# Patient Record
Sex: FEMALE | Race: ASIAN | NOT HISPANIC OR LATINO | Employment: UNEMPLOYED | URBAN - METROPOLITAN AREA
[De-identification: names, ages, dates, MRNs, and addresses within clinical notes are randomized per-mention and may not be internally consistent; named-entity substitution may affect disease eponyms.]

---

## 2017-03-13 ENCOUNTER — OFFICE VISIT (OUTPATIENT)
Dept: FAMILY MEDICINE | Facility: CLINIC | Age: 57
End: 2017-03-13
Payer: COMMERCIAL

## 2017-03-13 VITALS
HEIGHT: 60 IN | TEMPERATURE: 98 F | SYSTOLIC BLOOD PRESSURE: 139 MMHG | BODY MASS INDEX: 30.23 KG/M2 | HEART RATE: 65 BPM | OXYGEN SATURATION: 100 % | DIASTOLIC BLOOD PRESSURE: 80 MMHG | WEIGHT: 154 LBS

## 2017-03-13 DIAGNOSIS — I10 ESSENTIAL HYPERTENSION: ICD-10-CM

## 2017-03-13 DIAGNOSIS — H10.33 ACUTE CONJUNCTIVITIS OF BOTH EYES, UNSPECIFIED ACUTE CONJUNCTIVITIS TYPE: Primary | ICD-10-CM

## 2017-03-13 PROCEDURE — 99213 OFFICE O/P EST LOW 20 MIN: CPT | Performed by: INTERNAL MEDICINE

## 2017-03-13 RX ORDER — ERYTHROMYCIN 5 MG/G
1 OINTMENT OPHTHALMIC AT BEDTIME
Qty: 1 TUBE | Refills: 0 | Status: SHIPPED | OUTPATIENT
Start: 2017-03-13 | End: 2017-03-13

## 2017-03-13 RX ORDER — ERYTHROMYCIN 5 MG/G
1 OINTMENT OPHTHALMIC 4 TIMES DAILY
Qty: 1 TUBE | Refills: 0 | Status: SHIPPED | OUTPATIENT
Start: 2017-03-13 | End: 2017-07-14

## 2017-03-13 NOTE — NURSING NOTE
Chief Complaint   Patient presents with     Eye Problem     red. itchy, mattery, worse since Friday.  Juanito has pink eye.  Tried Eye Allergy Relief drops  & cough syrup        Initial /76 (BP Location: Right arm, Patient Position: Chair, Cuff Size: Adult Regular)  Pulse 65  Temp 98  F (36.7  C) (Oral)  Ht 5' (1.524 m)  Wt 154 lb (69.9 kg)  SpO2 100%  Breastfeeding? No  BMI 30.08 kg/m2 Estimated body mass index is 30.08 kg/(m^2) as calculated from the following:    Height as of this encounter: 5' (1.524 m).    Weight as of this encounter: 154 lb (69.9 kg).  Medication Reconciliation: complete

## 2017-03-13 NOTE — LETTER
[facility]  [address] [place] [address] [place] [address]  Phone: [phone]    03/13/17    Violet Nunez  [address] [place] [id number]      To whom it may concern:     Patient should be excused from work for medical reason on following dates:  3/13/17 through 3/14/17.        Sincerely,      Loli Thao MD

## 2017-03-13 NOTE — MR AVS SNAPSHOT
"              After Visit Summary   3/13/2017    Violet Nunez    MRN: [id number]           Patient Information     Date Of Birth          1960        Visit Information        Provider Department      3/13/2017 1:00 PM Loli Thao MD [facility]        Today's Diagnoses     Acute conjunctivitis of both eyes, unspecified acute conjunctivitis type    -  1    Essential hypertension          Care Instructions    Monitor your blood pressure once a week  at home.  Bring those readings on your next visit.  Notify us if your blood pressure readings consistently stays greater than 140/90.  Use erythromycin opthalmic ointment  Follow up in 2 months  Seek sooner medical attention if there is any worsening of symptoms or problems.          Follow-ups after your visit        Who to contact     If you have questions or need follow up information about today's clinic visit or your schedule please contact [facility] directly at [phone].  Normal or non-critical lab and imaging results will be communicated to you by MyChart, letter or phone within 4 business days after the clinic has received the results. If you do not hear from us within 7 days, please contact the clinic through MyChart or phone. If you have a critical or abnormal lab result, we will notify you by phone as soon as possible.  Submit refill requests through [employer] or call your pharmacy and they will forward the refill request to us. Please allow 3 business days for your refill to be completed.          Additional Information About Your Visit        MyChart Information     [employer] lets you send messages to your doctor, view your test results, renew your prescriptions, schedule appointments and more. To sign up, go to [email or web address] . Click on \"Log in\" on the left side of the screen, which will take you to the Welcome page. Then click on \"Sign up Now\" on the right side of the page.     You will be asked to enter " the access code listed below, as well as some personal information. Please follow the directions to create your username and password.     Your access code is: [email or web address]  Expires: 2017  1:37 PM     Your access code will  in 90 days. If you need help or a new code, please call your [facility] clinic or [phone].        Care EveryWhere ID     This is your Care EveryWhere ID. This could be used by other organizations to access your [facility] medical records  [id number]        Your Vitals Were     Pulse Temperature Height Pulse Oximetry Breastfeeding? BMI (Body Mass Index)    65 98  F (36.7  C) (Oral) 5' (1.524 m) 100% No 30.08 kg/m2       Blood Pressure from Last 3 Encounters:   17 139/80   16 149/77   16 139/78    Weight from Last 3 Encounters:   17 154 lb (69.9 kg)   16 157 lb (71.2 kg)   16 154 lb (69.9 kg)              Today, you had the following     No orders found for display         Today's Medication Changes          These changes are accurate as of: 3/13/17  1:37 PM.  If you have any questions, ask your nurse or doctor.               Start taking these medicines.        Dose/Directions    erythromycin ophthalmic ointment   Commonly known as:  ROMYCIN   Used for:  Acute conjunctivitis of both eyes, unspecified acute conjunctivitis type   Started by:  Loli Thao MD        Dose:  1 Application   Place 1 Application into both eyes 4 times daily Please note the change   Quantity:  1 Tube   Refills:  0            Where to get your medicines      These medications were sent to [facility], [place] - [address]  [address], [address], [facility] [address]     Phone:  [phone]     erythromycin ophthalmic ointment                Primary Care Provider Office Phone # Fax #    Loli Thao [phone]       [facility]    [address]  [facility] [address]         Thank you!     Thank you for choosing [facility]  for your care. Our goal is always to provide you with excellent care. Hearing back from our patients is one way we can continue to improve our services. Please take a few minutes to complete the written survey that you may receive in the mail after your visit with us. Thank you!             Your Updated Medication List - Protect others around you: Learn how to safely use, store and throw away your medicines at www.disposemymeds.org.          This list is accurate as of: 3/13/17  1:37 PM.  Always use your most recent med list.                   Brand Name Dispense Instructions for use    atenolol 50 MG tablet    TENORMIN    90 tablet    Take 1 tablet (50 mg) by mouth daily       erythromycin ophthalmic ointment    ROMYCIN    1 Tube    Place 1 Application into both eyes 4 times daily Please note the change       vitamin D 2000 UNITS Caps      Take 1 tablet by mouth daily.

## 2017-03-13 NOTE — PROGRESS NOTES
SUBJECTIVE:                                                    Violet Nunez is a 56 year old female who presents to clinic today for the following health issues:  She is accompanied by her daughter  Chief Complaint   Patient presents with     Eye Problem     red. itchy, mattery, worse since Friday.  Juanito has pink eye.  Tried Eye Allergy Relief drops  & cough syrup      Patient has had red and itchy eyes since Friday  When she wakes up in the morning her eyelids are crusted together  Has had a headache since yesterday  Her vision is not effected and she denies any burning sensation  She tried using eye drops for allergies without any improvement  Has had exposure to pink eye from her coworkers    Has not checked home blood pressure readings recently   Blood pressure was always high when they were measuring her blood pressure      Problem list and histories reviewed & adjusted, as indicated.  Additional history: as documented    Patient Active Problem List   Diagnosis     Vitamin D deficiency     Tendonitis of wrist, left     Essential hypertension     CARDIOVASCULAR SCREENING; LDL GOAL LESS THAN 130     Obesity (BMI 30-39.9)     Snoring     Cervical high risk HPV (human papillomavirus) test positive     Mixed hyperlipidemia     Past Surgical History   Procedure Laterality Date     Tubal/ectopic pregnancy       Colonoscopy  9/9/2013     Procedure: COLONOSCOPY;  COLONOSCOPY ;  Surgeon: Marcelino Crook MD;  Location:  GI       Social History   Substance Use Topics     Smoking status: Never Smoker     Smokeless tobacco: Never Used     Alcohol use No     Family History   Problem Relation Age of Onset     Hypertension Mother      DIABETES Mother      Hypertension       Hypertension Sister      Breast Cancer No family hx of      Cancer - colorectal No family hx of      Family History Negative Father          Current Outpatient Prescriptions   Medication Sig Dispense Refill     atenolol (TENORMIN) 50 MG  tablet Take 1 tablet (50 mg) by mouth daily 90 tablet 3     Cholecalciferol (VITAMIN D) 2000 UNIT CAPS Take 1 tablet by mouth daily.       Allergies   Allergen Reactions     No Known Allergies        Reviewed and updated as needed this visit by clinical staff       Reviewed and updated as needed this visit by Provider         ROS:  Constitutional, HEENT, cardiovascular, pulmonary, gi and gu systems are negative, except as otherwise noted.    POS for red and itchy eyes bilaterally, headache       This document serves as a record of the services and decisions personally performed and made by Loli Thao MD. It was created on her behalf by Mitch Romo, a trained medical scribe. The creation of this document is based on the provider's statements to the medical scribe.    Scribmaxx Romo 1:28 PM, March 13, 2017    OBJECTIVE:                                                    /76 (BP Location: Right arm, Patient Position: Chair, Cuff Size: Adult Regular)  Pulse 65  Temp 98  F (36.7  C) (Oral)  Ht 1.524 m (5')  Wt 69.9 kg (154 lb)  SpO2 100%  Breastfeeding? No  BMI 30.08 kg/m2  Body mass index is 30.08 kg/(m^2).  GENERAL APPEARANCE: healthy, alert and no distress  EYES: Erythema bilaterally,  corneas clear, no photophobia,   Vision normal  PSYCH: mentation appears normal and affect normal/bright       ASSESSMENT/PLAN:                                                      Violet was seen today for eye problem.    Diagnoses and all orders for this visit:    Acute conjunctivitis of both eyes, unspecified acute conjunctivitis type  Patient has had exposure to pink eye from her coworkers  She will use erythromycin opthalmic ointment to treat her condition  Was given a note to be excused from work to avoid further transmission  Was advised to be careful to avoid transmission to family members at home  -     Discontinue: erythromycin (ROMYCIN) ophthalmic ointment; Place 1 Application into both eyes At  Bedtime  -     erythromycin (ROMYCIN) ophthalmic ointment; Place 1 Application into both eyes 4 times daily Please note the change    Essential hypertension  Patient's blood pressure has been elevated  If blood pressure remains high I will start her on lisinopril 5 mg and keep her atenolol the same  Discussed the importance of keeping BP under good control to reduce the risk of heart attack and stroke.   Advised to continue to monitor bp once a week  at home and bring those readings on next visit.  Notify us if bp readings consistently stay greater than 140/90 mmHg  Discussed the importance of physical activity and a healthy diet      Follow up in 2 months  Patient was advised to seek sooner medical attention if there is any new or worsening symptoms    The information in this document, created by the medical scribe for me, accurately reflects the services I personally performed and the decisions made by me. I have reviewed and approved this document for accuracy prior to leaving the patient care area.  Loli Thao MD  1:40 PM, 03/13/17    Loli Thao MD  [facility]

## 2017-03-13 NOTE — PATIENT INSTRUCTIONS
Monitor your blood pressure once a week  at home.  Bring those readings on your next visit.  Notify us if your blood pressure readings consistently stays greater than 140/90.  Use erythromycin opthalmic ointment  Follow up in 2 months  Seek sooner medical attention if there is any worsening of symptoms or problems.

## 2017-04-06 ENCOUNTER — TELEPHONE (OUTPATIENT)
Dept: FAMILY MEDICINE | Facility: CLINIC | Age: 57
End: 2017-04-06

## 2017-07-06 ENCOUNTER — DOCUMENTATION ONLY (OUTPATIENT)
Dept: LAB | Facility: CLINIC | Age: 57
End: 2017-07-06

## 2017-07-06 DIAGNOSIS — E78.2 MIXED HYPERLIPIDEMIA: Primary | ICD-10-CM

## 2017-07-06 DIAGNOSIS — E55.9 VITAMIN D DEFICIENCY: ICD-10-CM

## 2017-07-06 NOTE — PROGRESS NOTES
This patient is coming in on 7/7/2017 for labs. As of now there are no orders in the chart. Please review patient chart and place orders. Thanks!  -Lab Staff

## 2017-07-07 DIAGNOSIS — E78.2 MIXED HYPERLIPIDEMIA: ICD-10-CM

## 2017-07-07 DIAGNOSIS — E55.9 VITAMIN D DEFICIENCY: ICD-10-CM

## 2017-07-07 LAB
CHOLEST SERPL-MCNC: 219 MG/DL
DEPRECATED CALCIDIOL+CALCIFEROL SERPL-MC: 47 UG/L (ref 20–75)
HDLC SERPL-MCNC: 79 MG/DL
LDLC SERPL CALC-MCNC: 125 MG/DL
NONHDLC SERPL-MCNC: 140 MG/DL
TRIGL SERPL-MCNC: 76 MG/DL

## 2017-07-07 PROCEDURE — 82306 VITAMIN D 25 HYDROXY: CPT | Performed by: INTERNAL MEDICINE

## 2017-07-07 PROCEDURE — 80061 LIPID PANEL: CPT | Performed by: INTERNAL MEDICINE

## 2017-07-07 PROCEDURE — 36415 COLL VENOUS BLD VENIPUNCTURE: CPT | Performed by: INTERNAL MEDICINE

## 2017-07-14 ENCOUNTER — OFFICE VISIT (OUTPATIENT)
Dept: FAMILY MEDICINE | Facility: CLINIC | Age: 57
End: 2017-07-14
Payer: COMMERCIAL

## 2017-07-14 ENCOUNTER — TELEPHONE (OUTPATIENT)
Dept: FAMILY MEDICINE | Facility: CLINIC | Age: 57
End: 2017-07-14

## 2017-07-14 VITALS
HEIGHT: 60 IN | HEART RATE: 72 BPM | DIASTOLIC BLOOD PRESSURE: 71 MMHG | TEMPERATURE: 99.2 F | BODY MASS INDEX: 30.04 KG/M2 | WEIGHT: 153 LBS | OXYGEN SATURATION: 98 % | SYSTOLIC BLOOD PRESSURE: 131 MMHG

## 2017-07-14 DIAGNOSIS — I10 ESSENTIAL HYPERTENSION: Primary | ICD-10-CM

## 2017-07-14 DIAGNOSIS — M25.562 ACUTE PAIN OF LEFT KNEE: ICD-10-CM

## 2017-07-14 PROCEDURE — 99213 OFFICE O/P EST LOW 20 MIN: CPT | Performed by: INTERNAL MEDICINE

## 2017-07-14 RX ORDER — NAPROXEN 500 MG/1
500 TABLET ORAL 2 TIMES DAILY WITH MEALS
Qty: 14 TABLET | Refills: 1 | Status: SHIPPED | OUTPATIENT
Start: 2017-07-14 | End: 2018-12-31

## 2017-07-14 NOTE — PATIENT INSTRUCTIONS
Take Naproxen twice a week with food for 1 week  If pain and swelling do not improve after 1 week of Naproxen see orthopedist.  Follow up in 6 months  Seek sooner medical attention if there is any worsening of symptoms or problems.

## 2017-07-14 NOTE — MR AVS SNAPSHOT
After Visit Summary   7/14/2017    Violet Nunez    MRN: [id number]           Patient Information     Date Of Birth          1960        Visit Information        Provider Department      7/14/2017 12:30 PM Loli Thao MD [facility]        Today's Diagnoses     Essential hypertension    -  1    Acute pain of left knee          Care Instructions    Take Naproxen twice a week with food for 1 week  If pain and swelling do not improve after 1 week of Naproxen see orthopedist.  Follow up in 6 months  Seek sooner medical attention if there is any worsening of symptoms or problems.            Follow-ups after your visit        Additional Services     ORTHOPEDICS ADULT REFERRAL       Your provider has referred you to: [facility] [place] [phone]   [email or web address]    Please be aware that coverage of these services is subject to the terms and limitations of your health insurance plan.  Call member services at your health plan with any benefit or coverage questions.      Please bring the following to your appointment:    >>   Any x-rays, CTs or MRIs which have been performed.  Contact the facility where they were done to arrange for  prior to your scheduled appointment.    >>   List of current medications   >>   This referral request   >>   Any documents/labs given to you for this referral                  Who to contact     If you have questions or need follow up information about today's clinic visit or your schedule please contact [facility] directly at [phone].  Normal or non-critical lab and imaging results will be communicated to you by MyChart, letter or phone within 4 business days after the clinic has received the results. If you do not hear from us within 7 days, please contact the clinic through MyChart or phone. If you have a critical or abnormal lab result, we will notify you by phone as soon as  "possible.  Submit refill requests through "[employer] or call your pharmacy and they will forward the refill request to us. Please allow 3 business days for your refill to be completed.          Additional Information About Your Visit        "[employer] Information     "[employer] lets you send messages to your doctor, view your test results, renew your prescriptions, schedule appointments and more. To sign up, go to [email or web address] . Click on \"Log in\" on the left side of the screen, which will take you to the Welcome page. Then click on \"Sign up Now\" on the right side of the page.     You will be asked to enter the access code listed below, as well as some personal information. Please follow the directions to create your username and password.     Your access code is: [email or web address]  Expires: 10/12/2017 12:48 PM     Your access code will  in 90 days. If you need help or a new code, please call your [place] clinic or [phone].        Care EveryWhere ID     This is your Care EveryWhere ID. This could be used by other organizations to access your [facility] medical records  [id number]        Your Vitals Were     Pulse Temperature Height Pulse Oximetry Breastfeeding? BMI (Body Mass Index)    72 99.2  F (37.3  C) (Oral) 5' (1.524 m) 98% No 29.88 kg/m2       Blood Pressure from Last 3 Encounters:   17 131/71   17 139/80   16 149/77    Weight from Last 3 Encounters:   17 153 lb (69.4 kg)   17 154 lb (69.9 kg)   16 157 lb (71.2 kg)              We Performed the Following     ORTHOPEDICS ADULT REFERRAL          Today's Medication Changes          These changes are accurate as of: 17 12:48 PM.  If you have any questions, ask your nurse or doctor.               Start taking these medicines.        Dose/Directions    naproxen 500 MG tablet   Commonly known as:  NAPROSYN   Used for:  Acute pain of left knee   Started by:  Loli Thao MD        Dose:  500 mg   Take 1 tablet " (500 mg) by mouth 2 times daily (with meals)   Quantity:  14 tablet   Refills:  1            Where to get your medicines      These medications were sent to [facility] [address]  [address] [address]     Phone:  [phone]     naproxen 500 MG tablet                Primary Care Provider Office Phone # Fax #    Loli Thao [phone]       [facility]    [address] [facility] [address]  [facility] [address]        Equal Access to Services     [facility] CenterTRINIDAD : Hadii aad ku hadasho Soevangelista, waaxda luqadaha, qaybta kaalmada ademagnoyada, waxrocael pann chandra ramesh . So [facility] [phone].    ATENCIÓN: Si habla español, tiene a moran disposición servicios gratuitos de asistencia lingüística. Klaudiaame al [phone].    We comply with applicable federal civil rights laws and [place] laws. We do not discriminate on the basis of race, color, national origin, age, disability sex, sexual orientation or gender identity.            Thank you!     Thank you for choosing [facility]  for your care. Our goal is always to provide you with excellent care. Hearing back from our patients is one way we can continue to improve our services. Please take a few minutes to complete the written survey that you may receive in the mail after your visit with us. Thank you!             Your Updated Medication List - Protect others around you: Learn how to safely use, store and throw away your medicines at www.disposemymeds.org.          This list is accurate as of: 7/14/17 12:48 PM.  Always use your most recent med list.                   Brand Name Dispense Instructions for use Diagnosis    atenolol 50 MG tablet    TENORMIN    90 tablet    Take 1 tablet (50 mg) by mouth daily    Essential hypertension       naproxen 500 MG tablet    NAPROSYN    14 tablet    Take 1 tablet (500 mg) by mouth 2 times daily  (with meals)    Acute pain of left knee       vitamin D 2000 UNITS Caps      Take 1 tablet by mouth daily.

## 2017-07-14 NOTE — PROGRESS NOTES
SUBJECTIVE:                                                    Violet Nunez is a 56 year old female who presents to clinic today for the following health issues:      Musculoskeletal problem/pain      Duration: x 1 month    Description  Location: Left knee pain-intermittent   Patient describes that it feels like the muscle is stiff     Intensity:  mild    Accompanying signs and symptoms: muscle pain/strain in lower left leg when bending or kneeling.    History  Previous similar problem: no   Previous evaluation:  none    Precipitating or alleviating factors:  Trauma or overuse: YES- exercising /walking.  Aggravating factors include: walking, exercise, overuse and kneeling and bending.    Therapies tried and outcome: rest/inactivity, stretching and acetaminophen- SX's aren't improving too much.    Denies discomfort when going up or down the stairs   Denies any trauma     Hypertension Follow-up      Outpatient blood pressures are being checked at home.  Results are <140/90 mmHg.    Low Salt Diet: not monitoring salt      Problem list and histories reviewed & adjusted, as indicated.  Additional history: as documented    Patient Active Problem List   Diagnosis     Vitamin D deficiency     Tendonitis of wrist, left     Essential hypertension     CARDIOVASCULAR SCREENING; LDL GOAL LESS THAN 130     Obesity (BMI 30-39.9)     Snoring     Cervical high risk HPV (human papillomavirus) test positive     Mixed hyperlipidemia     Past Surgical History:   Procedure Laterality Date     COLONOSCOPY  9/9/2013    Procedure: COLONOSCOPY;  COLONOSCOPY ;  Surgeon: Marcelino Crook MD;  Location:  GI     TUBAL/ECTOPIC PREGNANCY         Social History   Substance Use Topics     Smoking status: Never Smoker     Smokeless tobacco: Never Used     Alcohol use No     Family History   Problem Relation Age of Onset     Hypertension Mother      DIABETES Mother      Hypertension       Hypertension Sister      Breast Cancer No family hx  of      Cancer - colorectal No family hx of      Family History Negative Father          Current Outpatient Prescriptions   Medication Sig Dispense Refill     atenolol (TENORMIN) 50 MG tablet Take 1 tablet (50 mg) by mouth daily 90 tablet 3     Cholecalciferol (VITAMIN D) 2000 UNIT CAPS Take 1 tablet by mouth daily.       Allergies   Allergen Reactions     No Known Allergies        Reviewed and updated as needed this visit by clinical staff  Tobacco  Allergies  Soc Hx      Reviewed and updated as needed this visit by Provider         ROS:  Constitutional, HEENT, cardiovascular, pulmonary, gi and gu systems are negative, except as otherwise noted.    This document serves as a record of the services and decisions personally performed and made by Loli Thao MD. It was created on her behalf by Yulia Tidwell, a trained medical scribe. The creation of this document is based the provider's statements to the medical scribe.    Prince Tidwell 12:41 PM, July 14, 2017    OBJECTIVE:                                                    /71 (BP Location: Right arm, Patient Position: Sitting, Cuff Size: Adult Regular)  Pulse 72  Temp 99.2  F (37.3  C) (Oral)  Ht 5' (1.524 m)  Wt 153 lb (69.4 kg)  SpO2 98%  Breastfeeding? No  BMI 29.88 kg/m2  Body mass index is 29.88 kg/(m^2).  GENERAL APPEARANCE: alert, no distress and over weight  MS: left knee is evidently swollen compared to the right, normal ROM.   PSYCH: mentation appears normal and affect normal/bright       ASSESSMENT/PLAN:                                                    Violet was seen today for recheck.    Diagnoses and all orders for this visit:    Essential hypertension  Well controlled. Continue present medication.    Acute pain of left knee  -     naproxen (NAPROSYN) 500 MG tablet; Take 1 tablet (500 mg) by mouth 2 times daily (with meals)  -     ORTHOPEDICS ADULT REFERRAL  Unclear etiology. Could be an injury at work.  Will try  Naproxen for a week  If no improvement, advised to see orthopedist     Follow up in 6 months  Seek sooner medical attention if there is any worsening of symptoms or problems.      The information in this document, created by the medical scribe for me, accurately reflects the services I personally performed and the decisions made by me. I have reviewed and approved this document for accuracy prior to leaving the patient care area.  Loli Thao MD  12:46 PM, 07/14/17    Loli Thao MD  [facility]

## 2017-07-14 NOTE — NURSING NOTE
Chief Complaint   Patient presents with     RECHECK     knee       Initial /71 (BP Location: Right arm, Patient Position: Sitting, Cuff Size: Adult Regular)  Pulse 72  Temp 99.2  F (37.3  C) (Oral)  Ht 5' (1.524 m)  Wt 153 lb (69.4 kg)  SpO2 98%  Breastfeeding? No  BMI 29.88 kg/m2 Estimated body mass index is 29.88 kg/(m^2) as calculated from the following:    Height as of this encounter: 5' (1.524 m).    Weight as of this encounter: 153 lb (69.4 kg).  Medication Reconciliation: complete   Yolanda Huron- CMA

## 2017-08-01 DIAGNOSIS — I10 ESSENTIAL HYPERTENSION: ICD-10-CM

## 2017-08-01 RX ORDER — ATENOLOL 50 MG/1
TABLET ORAL
Start: 2017-08-01

## 2017-08-01 NOTE — TELEPHONE ENCOUNTER
atenolol (TENORMIN) 50 MG tablet        Last Written Prescription Date: 11/17/2016-Profile Only  Last Fill Quantity: 90, # refills: 3    Last Office Visit with [facility], [facility] or [facility] prescribing provider:  7/14/2017   Future Office Visit:        BP Readings from Last 3 Encounters:   07/14/17 131/71   03/13/17 139/80   12/05/16 149/77

## 2017-08-02 ENCOUNTER — TELEPHONE (OUTPATIENT)
Dept: FAMILY MEDICINE | Facility: CLINIC | Age: 57
End: 2017-08-02

## 2017-08-02 DIAGNOSIS — I10 ESSENTIAL HYPERTENSION: Primary | ICD-10-CM

## 2017-08-02 NOTE — TELEPHONE ENCOUNTER
Fax from Rosi Hooper requesting an alternative to Atenolol as it is on back order    Atenolol      Last Written Prescription Date: 11/17/2016  Last Fill Quantity: 90, # refills: 3    Last Office Visit with [facility], [facility] or [facility] prescribing provider:  7/14/2017 [place]       Potassium   Date Value Ref Range Status   11/30/2016 4.7 3.4 - 5.3 mmol/L Final     Creatinine   Date Value Ref Range Status   11/30/2016 0.65 0.52 - 1.04 mg/dL Final     BP Readings from Last 3 Encounters:   07/14/17 131/71   03/13/17 139/80   12/05/16 149/77     RT Kenzie(R)

## 2017-08-03 RX ORDER — METOPROLOL SUCCINATE 100 MG/1
100 TABLET, EXTENDED RELEASE ORAL DAILY
Qty: 90 TABLET | Refills: 1 | Status: SHIPPED | OUTPATIENT
Start: 2017-08-03 | End: 2018-01-28

## 2017-08-03 NOTE — TELEPHONE ENCOUNTER
Patient returning nurse call, if possible please leave as  Much info on vm as possible if she doesn't answer

## 2017-08-03 NOTE — TELEPHONE ENCOUNTER
Let patient know that we switched her medication due to unavailability of atenolol  Monitor BP at home weekly.  Follow up appointment in one month after switching medication   Dr.Nasima Master MD

## 2017-08-03 NOTE — TELEPHONE ENCOUNTER
Called Pt to discuss below. No answer. Left message advising them to call back into clinic. Awaiting callback.     Dayna Ann RN

## 2017-08-08 NOTE — TELEPHONE ENCOUNTER
Spoke with daughter on MANUEL.  She is wanting to know why pt switched off of Atenolol  Told her there is a shortage and med no longer available  Daughter states pt used to be on Atenolol which was $3 and now Metoprolol is $60  Daughter will callback if further questions or concerns, was informed this shortage would not be forever  Could eventually discuss getting back on Atenolol when higher supplies  Keri CHEW RN

## 2017-08-15 NOTE — TELEPHONE ENCOUNTER
Reason for Call: Medication     Detailed comments: Daughter Mc is calling to see if Violet Nunez needs to take the 100 mg of if she needs to take it in smaller dosage. You can leave a voice mail if no one is available     Phone Number Patient can be reached at: Home number on file [phone] (home)    Best Time: ASAP     Can we leave a detailed message on this number? YES    Call taken on 8/15/2017 at 11:19 AM by Kyra Davey

## 2017-08-15 NOTE — TELEPHONE ENCOUNTER
Huddle with Dr. Thao and this is the correct dose she wants her on and patient needs to be checking her BP after the change as well to see if there is any change in BP.    Call to Mc and recommendations explained to her and she understands and agrees with the plan.  She will let us know regarding BP results.  Kamryn Peralta RN

## 2017-10-18 ENCOUNTER — TRANSFERRED RECORDS (OUTPATIENT)
Dept: HEALTH INFORMATION MANAGEMENT | Facility: CLINIC | Age: 57
End: 2017-10-18

## 2017-11-21 ENCOUNTER — TELEPHONE (OUTPATIENT)
Dept: FAMILY MEDICINE | Facility: CLINIC | Age: 57
End: 2017-11-21

## 2017-11-21 DIAGNOSIS — Z79.899 MEDICATION MANAGEMENT: ICD-10-CM

## 2017-11-21 DIAGNOSIS — E78.2 MIXED HYPERLIPIDEMIA: Primary | ICD-10-CM

## 2017-11-21 NOTE — TELEPHONE ENCOUNTER
Reason for Call: Request for an order or referral:    Order or referral being requested: Fasting Labs    Date needed: as soon as possible    Has the patient been seen by the PCP for this problem? NO    Additional comments: Pt wants Fasting Lab Order put in so they can go to the [facility] to have her Labs done prior to her PX    Phone number Patient can be reached at:  Home number on file [phone] (home)    Best Time:  anytime    Can we leave a detailed message on this number?  YES    Call taken on 11/21/2017 at 12:48 PM by Charlie Sosa

## 2017-11-22 NOTE — TELEPHONE ENCOUNTER
Daughter called for orders on 3 family members -- notified Dr. Thao placed orders, she will schedule at [place].   Selin LINDSAY MA @ [facility]

## 2017-12-01 DIAGNOSIS — Z79.899 MEDICATION MANAGEMENT: ICD-10-CM

## 2017-12-01 DIAGNOSIS — E78.2 MIXED HYPERLIPIDEMIA: ICD-10-CM

## 2017-12-01 LAB
ALBUMIN SERPL-MCNC: 4 G/DL (ref 3.4–5)
ALP SERPL-CCNC: 58 U/L (ref 40–150)
ALT SERPL W P-5'-P-CCNC: 31 U/L (ref 0–50)
ANION GAP SERPL CALCULATED.3IONS-SCNC: 6 MMOL/L (ref 3–14)
AST SERPL W P-5'-P-CCNC: 20 U/L (ref 0–45)
BILIRUB SERPL-MCNC: 0.4 MG/DL (ref 0.2–1.3)
BUN SERPL-MCNC: 22 MG/DL (ref 7–30)
CALCIUM SERPL-MCNC: 9.6 MG/DL (ref 8.5–10.1)
CHLORIDE SERPL-SCNC: 107 MMOL/L (ref 94–109)
CHOLEST SERPL-MCNC: 258 MG/DL
CO2 SERPL-SCNC: 29 MMOL/L (ref 20–32)
CREAT SERPL-MCNC: 0.63 MG/DL (ref 0.52–1.04)
GFR SERPL CREATININE-BSD FRML MDRD: >90 ML/MIN/1.7M2
GLUCOSE SERPL-MCNC: 94 MG/DL (ref 70–99)
HDLC SERPL-MCNC: 77 MG/DL
LDLC SERPL CALC-MCNC: 160 MG/DL
NONHDLC SERPL-MCNC: 181 MG/DL
POTASSIUM SERPL-SCNC: 4.8 MMOL/L (ref 3.4–5.3)
PROT SERPL-MCNC: 7.8 G/DL (ref 6.8–8.8)
SODIUM SERPL-SCNC: 142 MMOL/L (ref 133–144)
TRIGL SERPL-MCNC: 103 MG/DL

## 2017-12-01 PROCEDURE — 36415 COLL VENOUS BLD VENIPUNCTURE: CPT | Performed by: INTERNAL MEDICINE

## 2017-12-01 PROCEDURE — 80061 LIPID PANEL: CPT | Performed by: INTERNAL MEDICINE

## 2017-12-01 PROCEDURE — 80053 COMPREHEN METABOLIC PANEL: CPT | Performed by: INTERNAL MEDICINE

## 2017-12-01 NOTE — LETTER
[address]  [address]  [place]  [address]  Tel: [phone]    December 4, 2017    Oswaldoaguedapatricia Elifox  [address] [place], [place] [address] [facility] [address]        Dear Ms. Nunez,    This is to inform you regarding your test result.    The testing of your blood sugar, kidney function, liver function and electrolytes was normal.  Your total cholesterol is elevated.  HDL which is called good cholesterol is normal.  Your LDL which is called bad cholesterol is elevated.  Eat low cholesterol low fat  diet and do regular physical activity.  Will discuss this further on your next office visit.    If you have any further questions or problems, please contact our office.      Sincerely,    Loli Thao MD/ Lotus Carnes CMA  Results for orders placed or performed in visit on 12/01/17   Lipid panel reflex to direct LDL Fasting   Result Value Ref Range    Cholesterol 258 (H) <200 mg/dL    Triglycerides 103 <150 mg/dL    HDL Cholesterol 77 >49 mg/dL    LDL Cholesterol Calculated 160 (H) <100 mg/dL    Non HDL Cholesterol 181 (H) <130 mg/dL   Comprehensive metabolic panel   Result Value Ref Range    Sodium 142 133 - 144 mmol/L    Potassium 4.8 3.4 - 5.3 mmol/L    Chloride 107 94 - 109 mmol/L    Carbon Dioxide 29 20 - 32 mmol/L    Anion Gap 6 3 - 14 mmol/L    Glucose 94 70 - 99 mg/dL    Urea Nitrogen 22 7 - 30 mg/dL    Creatinine 0.63 0.52 - 1.04 mg/dL    GFR Estimate >90 >60 mL/min/1.7m2    GFR Estimate If Black >90 >60 mL/min/1.7m2    Calcium 9.6 8.5 - 10.1 mg/dL    Bilirubin Total 0.4 0.2 - 1.3 mg/dL    Albumin 4.0 3.4 - 5.0 g/dL    Protein Total 7.8 6.8 - 8.8 g/dL    Alkaline Phosphatase 58 40 - 150 U/L    ALT 31 0 - 50 U/L    AST 20 0 - 45 U/L               Enclosure: Lab Results

## 2017-12-03 NOTE — PROGRESS NOTES
Please notify patient by sending following letter with copy of test results      Viktor Lazo,    This is to inform you regarding your test result.    The testing of your blood sugar, kidney function, liver function and electrolytes was normal.  Your total cholesterol is elevated.  HDL which is called good cholesterol is normal.  Your LDL which is called bad cholesterol is elevated.  Eat low cholesterol low fat  diet and do regular physical activity.  Will discuss this further on your next office visit.    Sincerely,      Dr.Nasima Master MD,FACP

## 2017-12-17 ENCOUNTER — HEALTH MAINTENANCE LETTER (OUTPATIENT)
Age: 57
End: 2017-12-17

## 2017-12-21 NOTE — PROGRESS NOTES
SUBJECTIVE:   CC: Violet Nunez is an 57 year old woman who presents for preventive health visit.     Healthy Habits:    Do you get at least three servings of calcium containing foods daily (dairy, green leafy vegetables, etc.)? yes    Amount of exercise or daily activities, outside of work: 5 day(s) per week    Problems taking medications regularly No    Medication side effects: No    Have you had an eye exam in the past two years? yes    Do you see a dentist twice per year? yes    Do you have sleep apnea, excessive snoring or daytime drowsiness?no    Hasn't been taking her anything for hyperlipidemia  Checks BP at home and has been controlled  Reports longstanding rhinorrhea    Today's PHQ-2 Score:   PHQ-2 ( 1999 [employer]) 12/22/2017 3/13/2017   Q1: Little interest or pleasure in doing things 0 0   Q2: Feeling down, depressed or hopeless 0 0   PHQ-2 Score 0 0         Abuse: Current or Past(Physical, Sexual or Emotional)- No  Do you feel safe in your environment - Yes  Social History   Substance Use Topics     Smoking status: Never Smoker     Smokeless tobacco: Never Used     Alcohol use No     If you drink alcohol do you typically have >3 drinks per day or >7 drinks per week? No                     Reviewed orders with patient.  Reviewed health maintenance and updated orders accordingly - Yes  Labs reviewed in EPIC  Patient Active Problem List   Diagnosis     Vitamin D deficiency     Tendonitis of wrist, left     Essential hypertension     CARDIOVASCULAR SCREENING; LDL GOAL LESS THAN 130     Obesity (BMI 30-39.9)     Snoring     Cervical high risk HPV (human papillomavirus) test positive     Mixed hyperlipidemia     Past Surgical History:   Procedure Laterality Date     COLONOSCOPY  9/9/2013    Procedure: COLONOSCOPY;  COLONOSCOPY ;  Surgeon: Marcelino Crook MD;  Location:  GI     TUBAL/ECTOPIC PREGNANCY         Social History   Substance Use Topics     Smoking status: Never Smoker     Smokeless  tobacco: Never Used     Alcohol use No     Family History   Problem Relation Age of Onset     Hypertension Mother      DIABETES Mother      Hypertension Sister      Family History Negative Father      Hypertension Other      Breast Cancer No family hx of      Cancer - colorectal No family hx of          Current Outpatient Prescriptions   Medication Sig Dispense Refill     atorvastatin (LIPITOR) 20 MG tablet Take 1 tablet (20 mg) by mouth daily 90 tablet 1     fluticasone (VERAMYST) 27.5 MCG/SPRAY spray Spray 1-2 sprays into both nostrils daily 10 g 3     loratadine (CLARITIN) 10 MG tablet Take 1 tablet (10 mg) by mouth daily 30 tablet 1     metoprolol (TOPROL-XL) 100 MG 24 hr tablet Take 1 tablet (100 mg) by mouth daily 90 tablet 1     naproxen (NAPROSYN) 500 MG tablet Take 1 tablet (500 mg) by mouth 2 times daily (with meals) 14 tablet 1     Cholecalciferol (VITAMIN D) 2000 UNIT CAPS Take 1 tablet by mouth daily.       Allergies   Allergen Reactions     No Known Allergies          Patient over age 50, mutual decision to screen reflected in health maintenance.    Pertinent mammograms are reviewed under the imaging tab.  History of abnormal Pap smear: NO - age 30-65 PAP every 5 years with negative HPV co-testing recommended    Reviewed and updated as needed this visit by clinical staffTobacco  Allergies  Meds  Problems  Med Hx  Surg Hx  Fam Hx  Soc Hx          Reviewed and updated as needed this visit by Provider          ROS:C: NEGATIVE for fever, chills, change in weight  I: NEGATIVE for worrisome rashes, moles or lesions  E: NEGATIVE for vision changes or irritation  ENT: NEGATIVE for ear, mouth and throat problems, POSITIVE for rhinorrhea  R: NEGATIVE for significant cough or SOB  B: NEGATIVE for masses, tenderness or discharge  CV: NEGATIVE for chest pain, palpitations or peripheral edema  GI: NEGATIVE for nausea, abdominal pain, heartburn, or change in bowel habits  : NEGATIVE for unusual urinary or  vaginal symptoms. No vaginal bleeding.  M: NEGATIVE for significant arthralgias or myalgia  N: NEGATIVE for weakness, dizziness or paresthesias  P: NEGATIVE for changes in mood or affect     This document serves as a record of the services and decisions personally performed and made by Loli Thao MD. It was created on her behalf by Liliane Sanchez, a trained medical scribe. The creation of this document is based on the provider's statements to the medical scribe.  Liliane Sanchez 1:10 PM December 22, 2017    OBJECTIVE:   /80  Pulse 77  Temp 98.1  F (36.7  C) (Oral)  Ht 1.524 m (5')  Wt 68.9 kg (152 lb)  SpO2 99%  Breastfeeding? No  BMI 29.69 kg/m2  EXAM:  GENERAL APPEARANCE: healthy, alert and no distress  EYES: Eyes grossly normal to inspection, PERRL and conjunctivae and sclerae normal  HENT: ear canals and TM's normal, nose and mouth without ulcers or lesions, oropharynx clear and oral mucous membranes moist  NECK: no adenopathy, no asymmetry, masses, or scars and thyroid normal to palpation  RESP: lungs clear to auscultation - no rales, rhonchi or wheezes  BREAST: normal without masses, tenderness or nipple discharge and no palpable axillary masses or adenopathy  CV: regular rate and rhythm, normal S1 S2, no S3 or S4, no murmur, click or rub, no peripheral edema and peripheral pulses strong  ABDOMEN: soft, nontender, no hepatosplenomegaly, no masses and bowel sounds normal   (female): normal female external genitalia, normal urethral meatus, vaginal mucosal atrophy noted, normal cervix, adnexae, and uterus without masses or abnormal discharge  MS: no musculoskeletal defects are noted and gait is age appropriate without ataxia  SKIN: no suspicious lesions or rashes  NEURO: Normal strength and tone, sensory exam grossly normal, mentation intact and speech normal  PSYCH: mentation appears normal and affect normal/bright    Pap smear done today    Reviewed and discussed lipid profile done on  12/01/17    ASSESSMENT/PLAN:   Violet was seen today for physical.    Diagnoses and all orders for this visit:    Routine history and physical examination of adult  Patient came in today for a wellness visit  Immunizations are up to date  She will schedule mammogram at her earliest convenience    Cervical cancer screening  -     Pap imaged thin layer diagnostic with HPV (select HPV order below)  -     HPV High Risk Types DNA Cervical  Pap smear today    Cervical high risk HPV (human papillomavirus) test positive  -     Pap imaged thin layer diagnostic with HPV (select HPV order below)  -     HPV High Risk Types DNA Cervical  Pap smear today    Essential hypertension  Checks BP at home and has been controlled  Initial BP was 159/74 but when I re-checked later in the visit, it was 130/80  I advised patient to monitor BP at home and she will let me know if it's uncontrolled    Mixed hyperlipidemia  -     atorvastatin (LIPITOR) 20 MG tablet; Take 1 tablet (20 mg) by mouth daily  -     Lipid panel reflex to direct LDL Fasting; Future  -     ALT; Future  Her lipid profile on 12/01/17 showed hyperlipidemia  Patient hasn't been taking her anything for hyperlipidemia  I put her on Lipitor, which she will start right away  She will come in for a lab appointment in 3 months to have her lipid profile re-checked  At that time, I will assess if the medication is working well for her    Rhinitis, unspecified chronicity, unspecified type  -     fluticasone (VERAMYST) 27.5 MCG/SPRAY spray; Spray 1-2 sprays into both nostrils daily  -     loratadine (CLARITIN) 10 MG tablet; Take 1 tablet (10 mg) by mouth daily  Reports longstanding rhinorrhea  Symptoms seem consistent with allergic rhinitis  I prescribed loratadine and fluticasone to be taken daily      Patient Instructions   Pap smear today  Take Claritin daily  Use saline nasal spray times daily  Monitor your blood pressure once a week  at home.  Bring those readings on your next  visit.  Notify us if your blood pressure readings consistently stays greater than 140/90.  Start taking Lipitor daily for your cholesterol  Schedule a mammogram at your earliest convenience  Schedule a fasting lab appointment in 3 months  Schedule an office visit 2-3 days after lab appointment      COUNSELING:   Reviewed preventive health counseling, as reflected in patient instructions       Regular exercise       Healthy diet/nutrition       Immunizations    Vaccinated for: Influenza and TDAP       reports that she has never smoked. She has never used smokeless tobacco.    Estimated body mass index is 29.69 kg/(m^2) as calculated from the following:    Height as of this encounter: 1.524 m (5').    Weight as of this encounter: 68.9 kg (152 lb).   Weight management plan: Discussed healthy diet and exercise guidelines and patient will follow up in 6 months in clinic to re-evaluate.    Counseling Resources:  ATP IV Guidelines  Pooled Cohorts Equation Calculator  Breast Cancer Risk Calculator  FRAX Risk Assessment  ICSI Preventive Guidelines  Dietary Guidelines for Americans, 2010  USDA's MyPlate  ASA Prophylaxis  Lung CA Screening    The information in this document, created by the medical scribe for me, accurately reflects the services I personally performed and the decisions made by me. I have reviewed and approved this document for accuracy prior to leaving the patient care area.  December 22, 2017 1:30 PM    Loli Thao MD  [facility]

## 2017-12-21 NOTE — PATIENT INSTRUCTIONS
Preventive Health Recommendations  Female Ages 50 - 64    Yearly exam: See your health care provider every year in order to  o Review health changes.   o Discuss preventive care.    o Review your medicines if your doctor has prescribed any.      Get a Pap test every three years (unless you have an abnormal result and your provider advises testing more often).    If you get Pap tests with HPV test, you only need to test every 5 years, unless you have an abnormal result.     You do not need a Pap test if your uterus was removed (hysterectomy) and you have not had cancer.    You should be tested each year for STDs (sexually transmitted diseases) if you're at risk.     Have a mammogram every 1 to 2 years.    Have a colonoscopy at age 50, or have a yearly FIT test (stool test). These exams screen for colon cancer.      Have a cholesterol test every 5 years, or more often if advised.    Have a diabetes test (fasting glucose) every three years. If you are at risk for diabetes, you should have this test more often.     If you are at risk for osteoporosis (brittle bone disease), think about having a bone density scan (DEXA).    Shots: Get a flu shot each year. Get a tetanus shot every 10 years.    Nutrition:     Eat at least 5 servings of fruits and vegetables each day.    Eat whole-grain bread, whole-wheat pasta and brown rice instead of white grains and rice.    Talk to your provider about Calcium and Vitamin D.     Lifestyle    Exercise at least 150 minutes a week (30 minutes a day, 5 days a week). This will help you control your weight and prevent disease.    Limit alcohol to one drink per day.    No smoking.     Wear sunscreen to prevent skin cancer.     See your dentist every six months for an exam and cleaning.    See your eye doctor every 1 to 2 years.    Pap smear today  Take Claritin daily  Use saline nasal spray times daily  Monitor your blood pressure once a week  at home.  Bring those readings on your next  visit.  Notify us if your blood pressure readings consistently stays greater than 140/90.  Start taking Lipitor daily for your cholesterol  Schedule a mammogram at your earliest convenience  Schedule a fasting lab appointment in 3 months  Schedule an office visit 2-3 days after lab appointment

## 2017-12-22 ENCOUNTER — OFFICE VISIT (OUTPATIENT)
Dept: FAMILY MEDICINE | Facility: CLINIC | Age: 57
End: 2017-12-22
Payer: COMMERCIAL

## 2017-12-22 VITALS
TEMPERATURE: 98.1 F | HEART RATE: 77 BPM | DIASTOLIC BLOOD PRESSURE: 80 MMHG | SYSTOLIC BLOOD PRESSURE: 130 MMHG | HEIGHT: 60 IN | BODY MASS INDEX: 29.84 KG/M2 | OXYGEN SATURATION: 99 % | WEIGHT: 152 LBS

## 2017-12-22 DIAGNOSIS — E78.2 MIXED HYPERLIPIDEMIA: ICD-10-CM

## 2017-12-22 DIAGNOSIS — Z00.00 ROUTINE HISTORY AND PHYSICAL EXAMINATION OF ADULT: Primary | ICD-10-CM

## 2017-12-22 DIAGNOSIS — I10 ESSENTIAL HYPERTENSION: ICD-10-CM

## 2017-12-22 DIAGNOSIS — J31.0 RHINITIS, UNSPECIFIED CHRONICITY, UNSPECIFIED TYPE: ICD-10-CM

## 2017-12-22 DIAGNOSIS — Z12.4 CERVICAL CANCER SCREENING: ICD-10-CM

## 2017-12-22 DIAGNOSIS — R87.810 CERVICAL HIGH RISK HPV (HUMAN PAPILLOMAVIRUS) TEST POSITIVE: ICD-10-CM

## 2017-12-22 PROCEDURE — 87624 HPV HI-RISK TYP POOLED RSLT: CPT | Performed by: INTERNAL MEDICINE

## 2017-12-22 PROCEDURE — 99396 PREV VISIT EST AGE 40-64: CPT | Performed by: INTERNAL MEDICINE

## 2017-12-22 PROCEDURE — 88175 CYTOPATH C/V AUTO FLUID REDO: CPT | Performed by: INTERNAL MEDICINE

## 2017-12-22 PROCEDURE — 99212 OFFICE O/P EST SF 10 MIN: CPT | Mod: 25 | Performed by: INTERNAL MEDICINE

## 2017-12-22 RX ORDER — ATORVASTATIN CALCIUM 20 MG/1
20 TABLET, FILM COATED ORAL DAILY
Qty: 90 TABLET | Refills: 1 | Status: SHIPPED | OUTPATIENT
Start: 2017-12-22 | End: 2018-06-22

## 2017-12-22 RX ORDER — LORATADINE 10 MG/1
10 TABLET ORAL DAILY
Qty: 30 TABLET | Refills: 1 | Status: SHIPPED | OUTPATIENT
Start: 2017-12-22 | End: 2019-03-11

## 2017-12-22 NOTE — LETTER
January 5, 2018    Violet Nunez  [address] [facility] [address]    Dear Violet,  We are happy to inform you that your PAP smear result from 12/22/17 is normal.  We are now able to do a follow up test on PAP smears. The DNA test is for HPV (Human Papilloma Virus). Cervical cancer is closely linked with certain types of HPV. Your result showed no evidence of high risk HPV.  Therefore we recommend you return in 3 years for your next pap smear and HPV test.  You will still need to return to the clinic every year for an annual exam and other preventive tests.  Please contact the clinic at [phone] with any questions.  Sincerely,    Loli Thao MD/[facility]

## 2017-12-22 NOTE — NURSING NOTE
Chief Complaint   Patient presents with     Physical       Initial /74  Pulse 77  Temp 98.1  F (36.7  C) (Oral)  Ht 5' (1.524 m)  Wt 152 lb (68.9 kg)  SpO2 99%  Breastfeeding? No  BMI 29.69 kg/m2 Estimated body mass index is 29.69 kg/(m^2) as calculated from the following:    Height as of this encounter: 5' (1.524 m).    Weight as of this encounter: 152 lb (68.9 kg).  Medication Reconciliation: complete   Lotus Carnes, CMA

## 2017-12-22 NOTE — MR AVS SNAPSHOT
After Visit Summary   12/22/2017    Violet Nunez    MRN: [id number]           Patient Information     Date Of Birth          1960        Visit Information        Provider Department      12/22/2017 1:00 PM Loli Thao MD [facility]        Today's Diagnoses     Routine history and physical examination of adult    -  1    Cervical cancer screening        Cervical high risk HPV (human papillomavirus) test positive        Essential hypertension        Mixed hyperlipidemia        Rhinitis, unspecified chronicity, unspecified type          Care Instructions      Preventive Health Recommendations  Female Ages 50 - 64    Yearly exam: See your health care provider every year in order to  o Review health changes.   o Discuss preventive care.    o Review your medicines if your doctor has prescribed any.      Get a Pap test every three years (unless you have an abnormal result and your provider advises testing more often).    If you get Pap tests with HPV test, you only need to test every 5 years, unless you have an abnormal result.     You do not need a Pap test if your uterus was removed (hysterectomy) and you have not had cancer.    You should be tested each year for STDs (sexually transmitted diseases) if you're at risk.     Have a mammogram every 1 to 2 years.    Have a colonoscopy at age 50, or have a yearly FIT test (stool test). These exams screen for colon cancer.      Have a cholesterol test every 5 years, or more often if advised.    Have a diabetes test (fasting glucose) every three years. If you are at risk for diabetes, you should have this test more often.     If you are at risk for osteoporosis (brittle bone disease), think about having a bone density scan (DEXA).    Shots: Get a flu shot each year. Get a tetanus shot every 10 years.    Nutrition:     Eat at least 5 servings of fruits and vegetables each day.    Eat whole-grain bread, whole-wheat pasta and brown rice  instead of white grains and rice.    Talk to your provider about Calcium and Vitamin D.     Lifestyle    Exercise at least 150 minutes a week (30 minutes a day, 5 days a week). This will help you control your weight and prevent disease.    Limit alcohol to one drink per day.    No smoking.     Wear sunscreen to prevent skin cancer.     See your dentist every six months for an exam and cleaning.    See your eye doctor every 1 to 2 years.    Pap smear today  Use saline nasal spray times daily  Monitor your blood pressure once a week  at home.  Bring those readings on your next visit.  Notify us if your blood pressure readings consistently stays greater than 140/90.  Start taking Lipitor daily for your cholesterol  Schedule a mammogram at your earliest convenience  Schedule a fasting lab appointment in 3 months  Schedule an office visit 2-3 days after lab appointment          Follow-ups after your visit        Future tests that were ordered for you today     Open Future Orders        Priority Expected Expires Ordered    Lipid panel reflex to direct LDL Fasting Routine 3/1/2018 6/29/2018 12/22/2017    ALT Routine 3/1/2018 6/29/2018 12/22/2017            Who to contact     If you have questions or need follow up information about today's clinic visit or your schedule please contact [facility] directly at [phone].  Normal or non-critical lab and imaging results will be communicated to you by MyChart, letter or phone within 4 business days after the clinic has received the results. If you do not hear from us within 7 days, please contact the clinic through [place] [employer] or phone. If you have a critical or abnormal lab result, we will notify you by phone as soon as possible.  Submit refill requests through [employer] or call your pharmacy and they will forward the refill request to us. Please allow 3 business days for your refill to be completed.          Additional Information About Your Visit        [employer]  "Information     [employer] lets you send messages to your doctor, view your test results, renew your prescriptions, schedule appointments and more. To sign up, go to [email or web address] . Click on \"Log in\" on the left side of the screen, which will take you to the Welcome page. Then click on \"Sign up Now\" on the right side of the page.     You will be asked to enter the access code listed below, as well as some personal information. Please follow the directions to create your username and password.     Your access code is: [email or web address]  Expires: 3/22/2018  1:29 PM     Your access code will  in 90 days. If you need help or a new code, please call your [facility] clinic or [phone].        Care EveryWhere ID     This is your Care EveryWhere ID. This could be used by other organizations to access your [facility] medical records  [id number]        Your Vitals Were     Pulse Temperature Height Pulse Oximetry Breastfeeding? BMI (Body Mass Index)    77 98.1  F (36.7  C) (Oral) 5' (1.524 m) 99% No 29.69 kg/m2       Blood Pressure from Last 3 Encounters:   17 130/80   17 131/71   17 139/80    Weight from Last 3 Encounters:   17 152 lb (68.9 kg)   17 153 lb (69.4 kg)   17 154 lb (69.9 kg)              We Performed the Following     HPV High Risk Types DNA Cervical     Pap imaged thin layer diagnostic with HPV (select HPV order below)          Today's Medication Changes          These changes are accurate as of: 17  1:29 PM.  If you have any questions, ask your nurse or doctor.               Start taking these medicines.        Dose/Directions    atorvastatin 20 MG tablet   Commonly known as:  LIPITOR   Used for:  Mixed hyperlipidemia   Started by:  Loli Thao MD        Dose:  20 mg   Take 1 tablet (20 mg) by mouth daily   Quantity:  90 tablet   Refills:  1       fluticasone 27.5 MCG/SPRAY spray   Commonly known as:  VERAMYST   Used for:  Rhinitis, unspecified " chronicity, unspecified type   Started by:  Loli Thao MD        Dose:  1-2 spray   Spray 1-2 sprays into both nostrils daily   Quantity:  10 g   Refills:  3       loratadine 10 MG tablet   Commonly known as:  CLARITIN   Used for:  Rhinitis, unspecified chronicity, unspecified type   Started by:  Loli Thao MD        Dose:  10 mg   Take 1 tablet (10 mg) by mouth daily   Quantity:  30 tablet   Refills:  1            Where to get your medicines      These medications were sent to [facility] [address] [place] [address] [address]  [address] [address]     Phone:  [phone]     atorvastatin 20 MG tablet    fluticasone 27.5 MCG/SPRAY spray    loratadine 10 MG tablet                Primary Care Provider Office Phone # Fax #    Loli Thao [phone] [address] [address]        Equal Access to Services     [facility] AH: Hadii aad ku hadasho Soomaali, waaxda luqadaha, qaybta kaalmada adeegyada, waxay idiin hayaan chandra ramesh . So [facility] [phone].    ATENCIÓN: Si habla español, tiene a moran disposición servicios gratuitos de asistencia lingüística. Llame al [phone].    We comply with applicable federal civil rights laws and [place] laws. We do not discriminate on the basis of race, color, national origin, age, disability, sex, sexual orientation, or gender identity.            Thank you!     Thank you for choosing [facility]  for your care. Our goal is always to provide you with excellent care. Hearing back from our patients is one way we can continue to improve our services. Please take a few minutes to complete the written survey that you may receive in the mail after your visit with us. Thank you!             Your Updated Medication List - Protect others around you: Learn how to safely use, store and throw away your medicines at www.disposemymeds.org.           This list is accurate as of: 12/22/17  1:29 PM.  Always use your most recent med list.                   Brand Name Dispense Instructions for use Diagnosis    atorvastatin 20 MG tablet    LIPITOR    90 tablet    Take 1 tablet (20 mg) by mouth daily    Mixed hyperlipidemia       fluticasone 27.5 MCG/SPRAY spray    VERAMYST    10 g    Spray 1-2 sprays into both nostrils daily    Rhinitis, unspecified chronicity, unspecified type       loratadine 10 MG tablet    CLARITIN    30 tablet    Take 1 tablet (10 mg) by mouth daily    Rhinitis, unspecified chronicity, unspecified type       metoprolol 100 MG 24 hr tablet    TOPROL-XL    90 tablet    Take 1 tablet (100 mg) by mouth daily    Essential hypertension       naproxen 500 MG tablet    NAPROSYN    14 tablet    Take 1 tablet (500 mg) by mouth 2 times daily (with meals)    Acute pain of left knee       vitamin D 2000 UNITS Caps      Take 1 tablet by mouth daily.

## 2017-12-27 LAB
COPATH REPORT: NORMAL
PAP: NORMAL

## 2017-12-28 ENCOUNTER — HOSPITAL ENCOUNTER (OUTPATIENT)
Dept: MAMMOGRAPHY | Facility: CLINIC | Age: 57
Discharge: HOME | End: 2017-12-28
Attending: INTERNAL MEDICINE | Admitting: INTERNAL MEDICINE
Payer: COMMERCIAL

## 2017-12-28 DIAGNOSIS — Z12.31 VISIT FOR SCREENING MAMMOGRAM: ICD-10-CM

## 2017-12-28 PROCEDURE — G0202 SCR MAMMO BI INCL CAD: HCPCS

## 2017-12-29 LAB
FINAL DIAGNOSIS: NORMAL
HPV HR 12 DNA CVX QL NAA+PROBE: NEGATIVE
HPV16 DNA SPEC QL NAA+PROBE: NEGATIVE
HPV18 DNA SPEC QL NAA+PROBE: NEGATIVE
SPECIMEN DESCRIPTION: NORMAL

## 2018-01-28 DIAGNOSIS — I10 ESSENTIAL HYPERTENSION: ICD-10-CM

## 2018-01-29 NOTE — TELEPHONE ENCOUNTER
"Requested Prescriptions   Pending Prescriptions Disp Refills     metoprolol succinate (TOPROL-XL) 100 MG 24 hr tablet [Pharmacy Med Name: METOPROLOL ER SUCCINATE 100MG TABS] 90 tablet 0     Sig: TAKE 1 TABLET BY MOUTH EVERY DAY    Beta-Blockers Protocol Passed    1/28/2018 12:13 PM       Passed - Blood pressure under 140/90    BP Readings from Last 3 Encounters:   12/22/17 130/80   07/14/17 131/71   03/13/17 139/80                Passed - Patient is age 6 or older       Passed - Recent or future visit with authorizing provider's specialty    Patient had office visit in the last year or has a visit in the next 30 days with authorizing provider.  See \"Patient Info\" tab in inbasket, or \"Choose Columns\" in Meds & Orders section of the refill encounter.             Last Written Prescription Date:  08/03/17Last Fill Quantity: 90,  # refills: 1   Last Office Visit with [facility] provider:  12/22/17   Future Office Visit:         Etta Flor MA    "

## 2018-01-30 RX ORDER — METOPROLOL SUCCINATE 100 MG/1
TABLET, EXTENDED RELEASE ORAL
Qty: 90 TABLET | Refills: 2 | Status: SHIPPED | OUTPATIENT
Start: 2018-01-30 | End: 2018-10-26

## 2018-07-11 ENCOUNTER — TRANSFERRED RECORDS (OUTPATIENT)
Dept: HEALTH INFORMATION MANAGEMENT | Facility: CLINIC | Age: 58
End: 2018-07-11

## 2018-10-26 DIAGNOSIS — E78.2 MIXED HYPERLIPIDEMIA: ICD-10-CM

## 2018-10-26 DIAGNOSIS — I10 ESSENTIAL HYPERTENSION: ICD-10-CM

## 2018-10-26 RX ORDER — ATORVASTATIN CALCIUM 20 MG/1
TABLET, FILM COATED ORAL
Qty: 90 TABLET | Refills: 0 | Status: SHIPPED | OUTPATIENT
Start: 2018-10-26 | End: 2018-12-31

## 2018-10-26 RX ORDER — METOPROLOL SUCCINATE 100 MG/1
TABLET, EXTENDED RELEASE ORAL
Qty: 90 TABLET | Refills: 0 | Status: SHIPPED | OUTPATIENT
Start: 2018-10-26 | End: 2018-12-31

## 2018-10-26 NOTE — TELEPHONE ENCOUNTER
"atorvastatin (LIPITOR) 20 MG tablet 90 tablet 0 6/25/2018         Last Written Prescription Date:  06/25/2018  Last Fill Quantity: 90,  # refills: 0   Last office visit: 12/22/2017 with prescribing provider:     Future Office Visit:  Left a message to pt return our call, needs to schedule an appointment with , last OV was on 12/22/2017      Requested Prescriptions   Pending Prescriptions Disp Refills     atorvastatin (LIPITOR) 20 MG tablet [Pharmacy Med Name: ATORVASTATIN 20MG TABLETS] 90 tablet 0     Sig: TAKE 1 TABLET(20 MG) BY MOUTH DAILY    Statins Protocol Passed    10/26/2018  1:04 PM       Passed - LDL on file in past 12 months    Recent Labs   Lab Test  12/01/17   1121   LDL  160*            Passed - No abnormal creatine kinase in past 12 months    No lab results found.            Passed - Recent (12 mo) or future (30 days) visit within the authorizing provider's specialty    Patient had office visit in the last 12 months or has a visit in the next 30 days with authorizing provider or within the authorizing provider's specialty.  See \"Patient Info\" tab in inbasket, or \"Choose Columns\" in Meds & Orders section of the refill encounter.             Passed - Patient is age 18 or older       Passed - No active pregnancy on record       Passed - No positive pregnancy test in past 12 months          "

## 2018-10-26 NOTE — TELEPHONE ENCOUNTER
Prescription approved per [facility] Refill Protocol.  Due for physical and fasting labs in December.  Farnaz Garcia RN

## 2018-10-26 NOTE — TELEPHONE ENCOUNTER
"  metoprolol succinate (TOPROL-XL) 100 MG 24 hr tablet 90 tablet 2 1/30/2018         Last Written Prescription Date:  01/30/2018  Last Fill Quantity: 90,  # refills: 2   Last office visit: 12/22/2017 with prescribing provider:     Future Office Visit:  Unknown    Requested Prescriptions   Pending Prescriptions Disp Refills     metoprolol succinate (TOPROL-XL) 100 MG 24 hr tablet [Pharmacy Med Name: METOPROLOL ER SUCCINATE 100MG TABS] 90 tablet 0     Sig: TAKE 1 TABLET BY MOUTH EVERY DAY    Beta-Blockers Protocol Passed    10/26/2018  1:04 PM       Passed - Blood pressure under 140/90 in past 12 months    BP Readings from Last 3 Encounters:   12/22/17 130/80   07/14/17 131/71   03/13/17 139/80                Passed - Patient is age 6 or older       Passed - Recent (12 mo) or future (30 days) visit within the authorizing provider's specialty    Patient had office visit in the last 12 months or has a visit in the next 30 days with authorizing provider or within the authorizing provider's specialty.  See \"Patient Info\" tab in inbasket, or \"Choose Columns\" in Meds & Orders section of the refill encounter.                "

## 2018-10-26 NOTE — TELEPHONE ENCOUNTER
Prescription approved per [facility] Refill Protocol.  Due for physical and fasting labs in December.  Farnaz Garcia RN

## 2018-12-20 ENCOUNTER — TELEPHONE (OUTPATIENT)
Dept: FAMILY MEDICINE | Facility: CLINIC | Age: 58
End: 2018-12-20

## 2018-12-20 DIAGNOSIS — Z13.29 SCREENING FOR THYROID DISORDER: ICD-10-CM

## 2018-12-20 DIAGNOSIS — Z13.0 SCREENING FOR DEFICIENCY ANEMIA: ICD-10-CM

## 2018-12-20 DIAGNOSIS — I10 ESSENTIAL HYPERTENSION: ICD-10-CM

## 2018-12-20 DIAGNOSIS — E78.2 MIXED HYPERLIPIDEMIA: Primary | ICD-10-CM

## 2018-12-20 NOTE — TELEPHONE ENCOUNTER
Reason for Call: Request for an order or referral:    Order or referral being requested: fasting labs for physical    Date needed: before my next appointment    Has the patient been seen by the PCP for this problem? Not Applicable    Additional comments:     Phone number Patient can be reached at:  Home number on file [phone] (home)    Best Time:  any    Can we leave a detailed message on this number?  YES    Call taken on 12/20/2018 at 11:28 AM by Jing John

## 2018-12-27 DIAGNOSIS — Z13.0 SCREENING FOR DEFICIENCY ANEMIA: ICD-10-CM

## 2018-12-27 DIAGNOSIS — I10 ESSENTIAL HYPERTENSION: ICD-10-CM

## 2018-12-27 DIAGNOSIS — E78.2 MIXED HYPERLIPIDEMIA: ICD-10-CM

## 2018-12-27 DIAGNOSIS — Z13.29 SCREENING FOR THYROID DISORDER: ICD-10-CM

## 2018-12-27 LAB
ALBUMIN SERPL-MCNC: 3.9 G/DL (ref 3.4–5)
ALP SERPL-CCNC: 88 U/L (ref 40–150)
ALT SERPL W P-5'-P-CCNC: 56 U/L (ref 0–50)
ANION GAP SERPL CALCULATED.3IONS-SCNC: 10 MMOL/L (ref 3–14)
AST SERPL W P-5'-P-CCNC: 29 U/L (ref 0–45)
BASOPHILS # BLD AUTO: 0 10E9/L (ref 0–0.2)
BASOPHILS NFR BLD AUTO: 0.4 %
BILIRUB SERPL-MCNC: 0.3 MG/DL (ref 0.2–1.3)
BUN SERPL-MCNC: 22 MG/DL (ref 7–30)
CALCIUM SERPL-MCNC: 9.3 MG/DL (ref 8.5–10.1)
CHLORIDE SERPL-SCNC: 108 MMOL/L (ref 94–109)
CHOLEST SERPL-MCNC: 191 MG/DL
CO2 SERPL-SCNC: 23 MMOL/L (ref 20–32)
CREAT SERPL-MCNC: 0.61 MG/DL (ref 0.52–1.04)
DIFFERENTIAL METHOD BLD: NORMAL
EOSINOPHIL # BLD AUTO: 0.3 10E9/L (ref 0–0.7)
EOSINOPHIL NFR BLD AUTO: 6.5 %
ERYTHROCYTE [DISTWIDTH] IN BLOOD BY AUTOMATED COUNT: 13.1 % (ref 10–15)
GFR SERPL CREATININE-BSD FRML MDRD: >90 ML/MIN/{1.73_M2}
GLUCOSE SERPL-MCNC: 109 MG/DL (ref 70–99)
HCT VFR BLD AUTO: 40.3 % (ref 35–47)
HDLC SERPL-MCNC: 63 MG/DL
HGB BLD-MCNC: 12.9 G/DL (ref 11.7–15.7)
LDLC SERPL CALC-MCNC: 110 MG/DL
LYMPHOCYTES # BLD AUTO: 1.9 10E9/L (ref 0.8–5.3)
LYMPHOCYTES NFR BLD AUTO: 36.4 %
MCH RBC QN AUTO: 28 PG (ref 26.5–33)
MCHC RBC AUTO-ENTMCNC: 32 G/DL (ref 31.5–36.5)
MCV RBC AUTO: 88 FL (ref 78–100)
MONOCYTES # BLD AUTO: 0.5 10E9/L (ref 0–1.3)
MONOCYTES NFR BLD AUTO: 8.8 %
NEUTROPHILS # BLD AUTO: 2.5 10E9/L (ref 1.6–8.3)
NEUTROPHILS NFR BLD AUTO: 47.9 %
NONHDLC SERPL-MCNC: 128 MG/DL
PLATELET # BLD AUTO: 196 10E9/L (ref 150–450)
POTASSIUM SERPL-SCNC: 3.9 MMOL/L (ref 3.4–5.3)
PROT SERPL-MCNC: 7.6 G/DL (ref 6.8–8.8)
RBC # BLD AUTO: 4.6 10E12/L (ref 3.8–5.2)
SODIUM SERPL-SCNC: 141 MMOL/L (ref 133–144)
TRIGL SERPL-MCNC: 92 MG/DL
TSH SERPL DL<=0.005 MIU/L-ACNC: 1.88 MU/L (ref 0.4–4)
WBC # BLD AUTO: 5.3 10E9/L (ref 4–11)

## 2018-12-27 PROCEDURE — 84443 ASSAY THYROID STIM HORMONE: CPT | Performed by: INTERNAL MEDICINE

## 2018-12-27 PROCEDURE — 80061 LIPID PANEL: CPT | Performed by: INTERNAL MEDICINE

## 2018-12-27 PROCEDURE — 36415 COLL VENOUS BLD VENIPUNCTURE: CPT | Performed by: INTERNAL MEDICINE

## 2018-12-27 PROCEDURE — 85025 COMPLETE CBC W/AUTO DIFF WBC: CPT | Performed by: INTERNAL MEDICINE

## 2018-12-27 PROCEDURE — 80053 COMPREHEN METABOLIC PANEL: CPT | Performed by: INTERNAL MEDICINE

## 2018-12-27 NOTE — LETTER
[address]  [address]  [place]  [address]  Tel: [phone]    December 28, 2018    Violet Elijanefadi  [address]  [facility] [address]        Dear Ms. Nunez,    Your total cholesterol is normal.  HDL which is called good cholesterol is normal.  Your LDL cholesterol is elevated.  Your triglycerides are normal.  TSH which is thyroid hormone is normal.  The testing of your kidney function, and electrolytes was satisfactory   Glucose which is your blood sugar is slightly elevated.  ALT which is liver test is elevated.  CBC result which includes white count Hemoglobin and  Platelet Counts is normal.   Eat low cholesterol low fat  diet and do regular physical activity. Avoid high sugar containing food.    Sincerely,    Loli Thao MD/KELLY          Enclosure: Lab Results  Results for orders placed or performed in visit on 12/27/18   Lipid panel reflex to direct LDL Fasting   Result Value Ref Range    Cholesterol 191 <200 mg/dL    Triglycerides 92 <150 mg/dL    HDL Cholesterol 63 >49 mg/dL    LDL Cholesterol Calculated 110 (H) <100 mg/dL    Non HDL Cholesterol 128 <130 mg/dL   TSH with free T4 reflex   Result Value Ref Range    TSH 1.88 0.40 - 4.00 mU/L   Comprehensive metabolic panel   Result Value Ref Range    Sodium 141 133 - 144 mmol/L    Potassium 3.9 3.4 - 5.3 mmol/L    Chloride 108 94 - 109 mmol/L    Carbon Dioxide 23 20 - 32 mmol/L    Anion Gap 10 3 - 14 mmol/L    Glucose 109 (H) 70 - 99 mg/dL    Urea Nitrogen 22 7 - 30 mg/dL    Creatinine 0.61 0.52 - 1.04 mg/dL    GFR Estimate >90 >60 mL/min/[1.73_m2]    GFR Estimate If Black >90 >60 mL/min/[1.73_m2]    Calcium 9.3 8.5 - 10.1 mg/dL    Bilirubin Total 0.3 0.2 - 1.3 mg/dL    Albumin 3.9 3.4 - 5.0 g/dL    Protein Total 7.6 6.8 - 8.8 g/dL    Alkaline Phosphatase 88 40 - 150 U/L    ALT 56 (H) 0 - 50 U/L    AST 29 0 - 45 U/L   CBC with platelets differential   Result Value Ref Range    WBC 5.3 4.0 - 11.0 10e9/L    RBC  Count 4.60 3.8 - 5.2 10e12/L    Hemoglobin 12.9 11.7 - 15.7 g/dL    Hematocrit 40.3 35.0 - 47.0 %    MCV 88 78 - 100 fl    MCH 28.0 26.5 - 33.0 pg    MCHC 32.0 31.5 - 36.5 g/dL    RDW 13.1 10.0 - 15.0 %    Platelet Count 196 150 - 450 10e9/L    % Neutrophils 47.9 %    % Lymphocytes 36.4 %    % Monocytes 8.8 %    % Eosinophils 6.5 %    % Basophils 0.4 %    Absolute Neutrophil 2.5 1.6 - 8.3 10e9/L    Absolute Lymphocytes 1.9 0.8 - 5.3 10e9/L    Absolute Monocytes 0.5 0.0 - 1.3 10e9/L    Absolute Eosinophils 0.3 0.0 - 0.7 10e9/L    Absolute Basophils 0.0 0.0 - 0.2 10e9/L    Diff Method Automated Method

## 2018-12-27 NOTE — LETTER
[address]  [address]  [place]  [address]  Tel: [phone]    December 28, 2018    Violet Sreedharfox  [address] [facility] [address]        Dear Ms. Elifox,    This is to inform you regarding your test result.    Your total cholesterol is normal.  HDL which is called good cholesterol is normal.  Your LDL cholesterol is elevated.  Your triglycerides are normal.  TSH which is thyroid hormone is normal.  The testing of your kidney function, and electrolytes was satisfactory   Glucose which is your blood sugar is slightly elevated.  ALT which is liver test is elevated.  CBC result which includes white count Hemoglobin and  Platelet Counts is normal.   Eat low cholesterol low fat  diet and do regular physical activity. Avoid high sugar containing food.    Sincerely,    Loli Thao MD/KELLY          Enclosure: Lab Results  Results for orders placed or performed in visit on 12/27/18   Lipid panel reflex to direct LDL Fasting   Result Value Ref Range    Cholesterol 191 <200 mg/dL    Triglycerides 92 <150 mg/dL    HDL Cholesterol 63 >49 mg/dL    LDL Cholesterol Calculated 110 (H) <100 mg/dL    Non HDL Cholesterol 128 <130 mg/dL   TSH with free T4 reflex   Result Value Ref Range    TSH 1.88 0.40 - 4.00 mU/L   Comprehensive metabolic panel   Result Value Ref Range    Sodium 141 133 - 144 mmol/L    Potassium 3.9 3.4 - 5.3 mmol/L    Chloride 108 94 - 109 mmol/L    Carbon Dioxide 23 20 - 32 mmol/L    Anion Gap 10 3 - 14 mmol/L    Glucose 109 (H) 70 - 99 mg/dL    Urea Nitrogen 22 7 - 30 mg/dL    Creatinine 0.61 0.52 - 1.04 mg/dL    GFR Estimate >90 >60 mL/min/[1.73_m2]    GFR Estimate If Black >90 >60 mL/min/[1.73_m2]    Calcium 9.3 8.5 - 10.1 mg/dL    Bilirubin Total 0.3 0.2 - 1.3 mg/dL    Albumin 3.9 3.4 - 5.0 g/dL    Protein Total 7.6 6.8 - 8.8 g/dL    Alkaline Phosphatase 88 40 - 150 U/L    ALT 56 (H) 0 - 50 U/L    AST 29 0 - 45 U/L   CBC with platelets differential   Result  Value Ref Range    WBC 5.3 4.0 - 11.0 10e9/L    RBC Count 4.60 3.8 - 5.2 10e12/L    Hemoglobin 12.9 11.7 - 15.7 g/dL    Hematocrit 40.3 35.0 - 47.0 %    MCV 88 78 - 100 fl    MCH 28.0 26.5 - 33.0 pg    MCHC 32.0 31.5 - 36.5 g/dL    RDW 13.1 10.0 - 15.0 %    Platelet Count 196 150 - 450 10e9/L    % Neutrophils 47.9 %    % Lymphocytes 36.4 %    % Monocytes 8.8 %    % Eosinophils 6.5 %    % Basophils 0.4 %    Absolute Neutrophil 2.5 1.6 - 8.3 10e9/L    Absolute Lymphocytes 1.9 0.8 - 5.3 10e9/L    Absolute Monocytes 0.5 0.0 - 1.3 10e9/L    Absolute Eosinophils 0.3 0.0 - 0.7 10e9/L    Absolute Basophils 0.0 0.0 - 0.2 10e9/L    Diff Method Automated Method

## 2018-12-28 NOTE — RESULT ENCOUNTER NOTE
Please notify patient by sending following letter with copy of test results      Viktor Lazo,    This is to inform you regarding your test result.    Your total cholesterol is normal.  HDL which is called good cholesterol is normal.  Your LDL cholesterol is elevated.  Your triglycerides are normal.  TSH which is thyroid hormone is normal.  The testing of your kidney function, and electrolytes was satisfactory   Glucose which is your blood sugar is slightly elevated.  ALT which is liver test is elevated.  CBC result which includes white count Hemoglobin and  Platelet Counts is normal.   Eat low cholesterol low fat  diet and do regular physical activity. Avoid high sugar containing food.          Sincerely,      Dr.Nasima Master MD,FACP

## 2018-12-31 ENCOUNTER — OFFICE VISIT (OUTPATIENT)
Dept: FAMILY MEDICINE | Facility: CLINIC | Age: 58
End: 2018-12-31
Payer: COMMERCIAL

## 2018-12-31 VITALS
WEIGHT: 157 LBS | HEART RATE: 80 BPM | BODY MASS INDEX: 30.66 KG/M2 | DIASTOLIC BLOOD PRESSURE: 78 MMHG | TEMPERATURE: 99.2 F | SYSTOLIC BLOOD PRESSURE: 138 MMHG | OXYGEN SATURATION: 97 %

## 2018-12-31 DIAGNOSIS — R74.01 ELEVATED ALT MEASUREMENT: ICD-10-CM

## 2018-12-31 DIAGNOSIS — I10 ESSENTIAL HYPERTENSION: ICD-10-CM

## 2018-12-31 DIAGNOSIS — J31.0 CHRONIC RHINITIS: ICD-10-CM

## 2018-12-31 DIAGNOSIS — R73.09 ELEVATED GLUCOSE: ICD-10-CM

## 2018-12-31 DIAGNOSIS — E78.2 MIXED HYPERLIPIDEMIA: ICD-10-CM

## 2018-12-31 DIAGNOSIS — Z00.00 ROUTINE HISTORY AND PHYSICAL EXAMINATION OF ADULT: Primary | ICD-10-CM

## 2018-12-31 DIAGNOSIS — J06.9 UPPER RESPIRATORY TRACT INFECTION, UNSPECIFIED TYPE: ICD-10-CM

## 2018-12-31 PROCEDURE — 99213 OFFICE O/P EST LOW 20 MIN: CPT | Mod: 25 | Performed by: INTERNAL MEDICINE

## 2018-12-31 PROCEDURE — 99396 PREV VISIT EST AGE 40-64: CPT | Performed by: INTERNAL MEDICINE

## 2018-12-31 RX ORDER — AZITHROMYCIN 250 MG/1
TABLET, FILM COATED ORAL
Qty: 6 TABLET | Refills: 0 | Status: SHIPPED | OUTPATIENT
Start: 2018-12-31 | End: 2019-02-28

## 2018-12-31 RX ORDER — ATORVASTATIN CALCIUM 20 MG/1
20 TABLET, FILM COATED ORAL DAILY
Qty: 90 TABLET | Refills: 3 | Status: SHIPPED | OUTPATIENT
Start: 2018-12-31 | End: 2019-03-11

## 2018-12-31 RX ORDER — METOPROLOL SUCCINATE 100 MG/1
100 TABLET, EXTENDED RELEASE ORAL DAILY
Qty: 90 TABLET | Refills: 3 | Status: SHIPPED | OUTPATIENT
Start: 2018-12-31 | End: 2020-01-29

## 2018-12-31 NOTE — PROGRESS NOTES
SUBJECTIVE:   CC: Violet Nunez is an 58 year old woman who presents for preventive health visit.     Patient was accompanied by her daughter, who helped give her history.    Healthy Habits:    Do you get at least three servings of calcium containing foods daily (dairy, green leafy vegetables, etc.)? no, taking calcium and/or vitamin D supplement: no    Amount of exercise or daily activities, outside of work: 0 day(s) per week    Problems taking medications regularly No    Medication side effects: No    Have you had an eye exam in the past two years? yes    Do you see a dentist twice per year? yes    Do you have sleep apnea, excessive snoring or daytime drowsiness?no    Respiratory symptoms  Reports cough, sore throat for past week  She had some fatigue initially  However, fatigue is better now  Reports yellow-colored sputum  Her daughter was sick with similar symptoms  She was given Augmentin and has improved    Today's PHQ-2 Score:   PHQ-2 ( 1999 [employer]) 12/31/2018 12/22/2017   Q1: Little interest or pleasure in doing things 0 0   Q2: Feeling down, depressed or hopeless 0 0   PHQ-2 Score 0 0       Abuse: Current or Past(Physical, Sexual or Emotional)- No  Do you feel safe in your environment? Yes    Social History     Tobacco Use     Smoking status: Never Smoker     Smokeless tobacco: Never Used   Substance Use Topics     Alcohol use: No     Alcohol/week: 0.0 oz     If you drink alcohol do you typically have >3 drinks per day or >7 drinks per week? No                     Reviewed orders with patient.  Reviewed health maintenance and updated orders accordingly - Yes  Labs reviewed in EPIC  Patient Active Problem List   Diagnosis     Vitamin D deficiency     Tendonitis of wrist, left     Essential hypertension     CARDIOVASCULAR SCREENING; LDL GOAL LESS THAN 130     Obesity (BMI 30-39.9)     Snoring     Cervical high risk HPV (human papillomavirus) test positive     Mixed hyperlipidemia     Past Surgical  History:   Procedure Laterality Date     COLONOSCOPY  9/9/2013    Procedure: COLONOSCOPY;  COLONOSCOPY ;  Surgeon: Marcelino Crook MD;  Location:  GI     TUBAL/ECTOPIC PREGNANCY         Social History     Tobacco Use     Smoking status: Never Smoker     Smokeless tobacco: Never Used   Substance Use Topics     Alcohol use: No     Alcohol/week: 0.0 oz     Family History   Problem Relation Age of Onset     Hypertension Mother      Diabetes Mother      Hypertension Sister      Family History Negative Father      Hypertension Other      Breast Cancer No family hx of      Cancer - colorectal No family hx of          Current Outpatient Medications   Medication Sig Dispense Refill     atorvastatin (LIPITOR) 20 MG tablet Take 1 tablet (20 mg) by mouth daily 90 tablet 3     azithromycin (ZITHROMAX) 250 MG tablet Two tablets first day, then one tablet daily for four days. 6 tablet 0     Cholecalciferol (VITAMIN D) 2000 UNIT CAPS Take 1 tablet by mouth daily.       fluticasone (VERAMYST) 27.5 MCG/SPRAY nasal spray Spray 1-2 sprays into both nostrils daily 10 g 3     loratadine (CLARITIN) 10 MG tablet Take 1 tablet (10 mg) by mouth daily 30 tablet 1     metoprolol succinate ER (TOPROL-XL) 100 MG 24 hr tablet Take 1 tablet (100 mg) by mouth daily 90 tablet 3     Allergies   Allergen Reactions     No Known Allergies        Mammogram Screening: Patient over age 50, mutual decision to screen reflected in health maintenance.    Pertinent mammograms are reviewed under the imaging tab.  History of abnormal Pap smear: NO - age 30- 65 PAP every 3 years recommended  PAP / HPV Latest Ref Rng & Units 12/22/2017 11/17/2016 7/26/2013   PAP - NIL NIL NIL   HPV 16 DNA NEG:Negative Negative Negative -   HPV 18 DNA NEG:Negative Negative Negative -   OTHER HR HPV NEG:Negative Negative Positive(A) -     Reviewed and updated as needed this visit by clinical staff  Tobacco  Allergies  Meds  Problems  Med Hx  Surg Hx  Fam Hx          Reviewed and updated as needed this visit by Provider        ROS:  CONSTITUTIONAL: NEGATIVE for fever, chills, change in weight  INTEGUMENTARY/SKIN: NEGATIVE for worrisome rashes, moles or lesions  EYES: NEGATIVE for vision changes or irritation  ENT: POSITIVE for cough with yellow sputum, sore throat  RESP: NEGATIVE for significant cough or SOB  BREAST: NEGATIVE for masses, tenderness or discharge  CV: NEGATIVE for chest pain, palpitations or peripheral edema  GI: NEGATIVE for nausea, abdominal pain, heartburn, or change in bowel habits  : NEGATIVE for unusual urinary or vaginal symptoms. No vaginal bleeding.  MUSCULOSKELETAL: NEGATIVE for significant arthralgias or myalgia  NEURO: NEGATIVE for weakness, dizziness or paresthesias  PSYCHIATRIC: NEGATIVE for changes in mood or affect     This document serves as a record of the services and decisions personally performed and made by Loli Thao MD. It was created on her behalf by Liliane Sanchez, a trained medical scribe. The creation of this document is based on the provider's statements to the medical scribe.  Liliane Sanchez 4:00 PM December 31, 2018    OBJECTIVE:   /75 (BP Location: Right arm, Cuff Size: Adult Large)   Pulse 80   Temp 99.2  F (37.3  C) (Tympanic)   Wt 71.2 kg (157 lb)   SpO2 97%   BMI 30.66 kg/m    EXAM:  GENERAL APPEARANCE: healthy, alert and no distress  EYES: Eyes grossly normal to inspection, PERRL and conjunctivae and sclerae normal  HENT: ear canals and TM's normal, nose and mouth without ulcers or lesions, oropharynx clear and oral mucous membranes moist  NECK: no adenopathy, no asymmetry, masses, or scars and thyroid normal to palpation  RESP: lungs clear to auscultation - no rales, rhonchi or wheezes  BREAST: normal without masses, tenderness or nipple discharge and no palpable axillary masses or adenopathy  CV: regular rate and rhythm, normal S1 S2, no S3 or S4, no murmur, click or rub, no peripheral edema and  peripheral pulses strong  ABDOMEN: soft, nontender, no hepatosplenomegaly, no masses and bowel sounds normal  MS: no musculoskeletal defects are noted and gait is age appropriate without ataxia  SKIN: numerous moles and freckles all over her body  NEURO: Normal strength and tone, sensory exam grossly normal, mentation intact and speech normal  PSYCH: mentation appears normal and affect normal/bright    Diagnostic Test Results:  No results found for this or any previous visit (from the past 24 hour(s)).    Reviewed and discussed labs done on 12/27/18  Reviewed and discussed mammogram done on 12/28/17  Reviewed and discussed colonoscopy done on 09/09/13, follow up in 10 years  Reviewed and discussed pap smear done on 12/22/17  ASSESSMENT/PLAN:   Violet was seen today for physical.    Diagnoses and all orders for this visit:    Routine history and physical examination of adult  Patient came in today for a wellness visit  Up to date on colonoscopy  Due for mammogram  She will schedule it  Flu shot given in office visit today    Essential hypertension  -     metoprolol succinate ER (TOPROL-XL) 100 MG 24 hr tablet; Take 1 tablet (100 mg) by mouth daily  Doing well  Compliant with medication  Monitor your blood pressure once a week  at home.  Bring those readings on your next visit.  Notify us if your blood pressure readings consistently stays greater than 140/90.    Mixed hyperlipidemia  -     atorvastatin (LIPITOR) 20 MG tablet; Take 1 tablet (20 mg) by mouth daily  -     Lipid panel reflex to direct LDL Fasting; Future  Doing well  Compliant with medication    Elevated ALT measurement  -     Hepatic panel; Future  Labs on 12/22 showed elevated ALT and glucose  Advised re-check in 3 motnhs    Elevated glucose  -     Glucose; Future  -     Hemoglobin A1c; Future  See above    Chronic rhinitis  -     fluticasone (VERAMYST) 27.5 MCG/SPRAY nasal spray; Spray 1-2 sprays into both nostrils daily  Stable  Works well for  her    Upper respiratory tract infection, unspecified type  -     azithromycin (ZITHROMAX) 250 MG tablet; Two tablets first day, then one tablet daily for four days.  Patient was exposed to her daughter who was sick with URTI  Reports cough with yellow sputum and sore throat for past week  Daughter was given Augmentin to help symptoms  Prescribed azithromycin  Advised staying well-hydrated and extra rest    Patient Instructions   I refilled your prescriptions  Take z-mario 2 tablets today, 1 tablet for next 4 days  Drink plenty of fluids  Take extra rest  Use saline nasal spray  Do warm saline gurgles  Take Tylenol as needed for pain  Mammogram is done in [address]  Please call the following number to make appointment at your earliest convenience:  [phone]  There is a new Shingles vaccine called SHINGRIX  It's is a series of two shots, 2-6 months apart  It is considered more than 90% effective  Please go to our pharmacy located in [address] to get the vaccine  Schedule lab only appointment in 3 months  Seek sooner medical attention if there is any worsening of symptoms or problems    COUNSELING:   Reviewed preventive health counseling, as reflected in patient instructions       Regular exercise       Healthy diet/nutrition    BP Readings from Last 1 Encounters:   12/31/18 154/75     Estimated body mass index is 30.66 kg/m  as calculated from the following:    Height as of 12/22/17: 1.524 m (5').    Weight as of this encounter: 71.2 kg (157 lb).     reports that  has never smoked. she has never used smokeless tobacco.    Counseling Resources:  ATP IV Guidelines  Pooled Cohorts Equation Calculator  Breast Cancer Risk Calculator  FRAX Risk Assessment  ICSI Preventive Guidelines  Dietary Guidelines for Americans, 2010  USDA's MyPlate  ASA Prophylaxis  Lung CA Screening    The information in this document, created by the medical scribe for me, accurately reflects the services I personally performed and the decisions  made by me. I have reviewed and approved this document for accuracy prior to leaving the patient care area.  December 31, 2018 4:19 PM    Loli Thao MD  [facility]

## 2018-12-31 NOTE — PATIENT INSTRUCTIONS
Preventive Health Recommendations  Female Ages 50 - 64    Yearly exam: See your health care provider every year in order to  o Review health changes.   o Discuss preventive care.    o Review your medicines if your doctor has prescribed any.      Get a Pap test every three years (unless you have an abnormal result and your provider advises testing more often).    If you get Pap tests with HPV test, you only need to test every 5 years, unless you have an abnormal result.     You do not need a Pap test if your uterus was removed (hysterectomy) and you have not had cancer.    You should be tested each year for STDs (sexually transmitted diseases) if you're at risk.     Have a mammogram every 1 to 2 years.    Have a colonoscopy at age 50, or have a yearly FIT test (stool test). These exams screen for colon cancer.      Have a cholesterol test every 5 years, or more often if advised.    Have a diabetes test (fasting glucose) every three years. If you are at risk for diabetes, you should have this test more often.     If you are at risk for osteoporosis (brittle bone disease), think about having a bone density scan (DEXA).    Shots: Get a flu shot each year. Get a tetanus shot every 10 years.    Nutrition:     Eat at least 5 servings of fruits and vegetables each day.    Eat whole-grain bread, whole-wheat pasta and brown rice instead of white grains and rice.    Get adequate Calcium and Vitamin D.     Lifestyle    Exercise at least 150 minutes a week (30 minutes a day, 5 days a week). This will help you control your weight and prevent disease.    Limit alcohol to one drink per day.    No smoking.     Wear sunscreen to prevent skin cancer.     See your dentist every six months for an exam and cleaning.    See your eye doctor every 1 to 2 years.    I refilled your prescriptions  Take z-mario 2 tablets today, 1 tablet for next 4 days  Drink plenty of fluids  Take extra rest  Use saline nasal spray  Do warm saline  michaelrdiomedes  Take Tylenol as needed for pain  Mammogram is done in [address]  Please call the following number to make appointment at your earliest convenience:  [phone]  There is a new Shingles vaccine called SHINGRIX  It's is a series of two shots, 2-6 months apart  It is considered more than 90% effective  Please go to our pharmacy located in [address] to get the vaccine  Schedule lab only appointment in 3 months  Seek sooner medical attention if there is any worsening of symptoms or problems

## 2019-02-28 ENCOUNTER — ANCILLARY PROCEDURE (OUTPATIENT)
Dept: GENERAL RADIOLOGY | Facility: CLINIC | Age: 59
End: 2019-02-28
Attending: INTERNAL MEDICINE
Payer: COMMERCIAL

## 2019-02-28 ENCOUNTER — OFFICE VISIT (OUTPATIENT)
Dept: FAMILY MEDICINE | Facility: CLINIC | Age: 59
End: 2019-02-28
Payer: COMMERCIAL

## 2019-02-28 VITALS
TEMPERATURE: 98.3 F | HEART RATE: 65 BPM | HEIGHT: 60 IN | SYSTOLIC BLOOD PRESSURE: 150 MMHG | WEIGHT: 157.2 LBS | BODY MASS INDEX: 30.86 KG/M2 | OXYGEN SATURATION: 99 % | DIASTOLIC BLOOD PRESSURE: 70 MMHG

## 2019-02-28 DIAGNOSIS — I10 ESSENTIAL HYPERTENSION: ICD-10-CM

## 2019-02-28 DIAGNOSIS — M25.561 ACUTE PAIN OF RIGHT KNEE: ICD-10-CM

## 2019-02-28 DIAGNOSIS — M25.561 ACUTE PAIN OF RIGHT KNEE: Primary | ICD-10-CM

## 2019-02-28 PROCEDURE — 73560 X-RAY EXAM OF KNEE 1 OR 2: CPT | Mod: RT

## 2019-02-28 PROCEDURE — 99213 OFFICE O/P EST LOW 20 MIN: CPT | Performed by: INTERNAL MEDICINE

## 2019-02-28 RX ORDER — HYDROCHLOROTHIAZIDE 12.5 MG/1
12.5 TABLET ORAL DAILY
Qty: 90 TABLET | Refills: 1 | Status: SHIPPED | OUTPATIENT
Start: 2019-02-28 | End: 2019-09-11

## 2019-02-28 ASSESSMENT — MIFFLIN-ST. JEOR: SCORE: 1209.55

## 2019-02-28 NOTE — PATIENT INSTRUCTIONS
Work letter given today  Use naproxen for 1 week with food  Ice your knee  Keep leg elevated  Schedule an appointment with physical therapy  Start hydrochlorothiazide in the morning for your blood pressure  Monitor your blood pressure once a week at home.  Bring those readings on your next visit.  Notify us if your blood pressure readings consistently stays greater than 140/90.  Follow up in 2 weeks   Seek sooner medical attention if there is any worsening of symptoms or problems

## 2019-02-28 NOTE — LETTER
[facility]  [address] [place] [address]  Phone: [phone]    02/28/19    Violet Nunez  [address] [place] [id number]      To whom it may concern:     Patient should be excused from work due to medical condition on following dates:  3/1/19 and 3/2/2019.        Sincerely,      Loli Thao MD

## 2019-02-28 NOTE — LETTER
[address]. [facility]  [address]  [place]  [address]  Tel: [phone]    March 1, 2019    Violet Nunez  [address] [place], [place] [address]E [facility] [id number]        Dear Ms. Nunez,    This is to inform you regarding your test result.    I tried to contact you but got the VM.  Your x-ray results shows no acute finding.  Minimal changes due to osteoarthritis    Sincerely,    Loli Thao MD/KELLY

## 2019-02-28 NOTE — PROGRESS NOTES
SUBJECTIVE:   Violet Nunez is a 58 year old female who presents to clinic today for the following health issues:    Musculoskeletal problem/pain      Duration: intermittent Right knee pain x 2-3 weeks    Description  Location: Right knee    Intensity:  moderate    Accompanying signs and symptoms: radiation of pain to Right lower leg    History  Previous similar problem: no   Previous evaluation:  none    Precipitating or alleviating factors:  Trauma or overuse: no   Aggravating factors include: standing, walking, climbing stairs and position change    Therapies tried and outcome: acetaminophen    Patient is a house keeper  Reports that it's worse with movement  It's better with rest  Pain started as intermittent  However, pain is now persistent    Hypertension  Patient was stressed coming into office visit  Her family checks her BP at home  Reports that it's usually well-controlled  However, reports some higher readings at home    Problem list and histories reviewed & adjusted, as indicated.  Additional history: as documented    Medications and labs reviewed in EPIC    Reviewed and updated as needed this visit by clinical staff  Tobacco  Allergies  Meds  Soc Hx      Reviewed and updated as needed this visit by Provider       ROS:  Constitutional, HEENT, cardiovascular, pulmonary, GI, , musculoskeletal, neuro, skin, endocrine and psych systems are negative, except as otherwise noted.    POSITIVE for right knee pain    This document serves as a record of the services and decisions personally performed and made by Loli Thao MD. It was created on her behalf by Liliane Sanchez, a trained medical scribe. The creation of this document is based on the provider's statements to the medical scribe.  Liliane Sanchez 3:47 PM February 28, 2019    OBJECTIVE:     /70   Pulse 65   Temp 98.3  F (36.8  C) (Oral)   Ht 1.524 m (5')   Wt 71.3 kg (157 lb 3.2 oz)   SpO2 99%   BMI 30.70 kg/m    Body mass  index is 30.7 kg/m .    GENERAL: obese, alert and no distress  MS: right knee minimally swollen, flexion minimally painful  No signs of infection.  PSYCH: mentation appears normal, affect normal/bright    Diagnostic Test Results:  No results found for this or any previous visit (from the past 24 hour(s)).    ASSESSMENT/PLAN:     Violet was seen today for musculoskeletal problem.    Diagnoses and all orders for this visit:    Acute pain of right knee  -     naproxen (NAPROSYN) 375 MG tablet; Take 1 tablet (375 mg) by mouth 2 times daily as needed for moderate pain  -     [facility] HAND, AND CHIROPRACTIC REFERRAL; Future  -     XR Knee Standing Right 2 Views; Future  Patient reports intermittent right knee pain for past 2-3 weeks  It is moderate in severity and radiates to her lower leg  Pain has recently become constant  She is a  and moving exacerbates her pain  Rest helps alleviate her symptoms  Upon examination, her knee was minimally swollen and minimally painful on flexion  Explained to patient that this is likely a knee sprain  XR ordered today  Referred to PT  She will schedule appointment  Advised use of naproxen with food for 1 week  Wrote a letter so patient takes work off for a couple of days  Advised icing the area and keeping it elevated  If symptoms don't improve in a couple of weeks, I will refer to orthopedics specialist    Essential hypertension  -     hydrochlorothiazide (HYDRODIURIL) 12.5 MG tablet; Take 1 tablet (12.5 mg) by mouth daily  Patient's BP has been consistently staying on higher side  Reports that home readings are sometimes not well-controlled  Prescribed hydrochlorothiazide  Advised taking it in the morning  Advised continued monitoring at home and reporting readings above 140/90  Educated patient that HTN is a risk factor for heart attack and stroke  Follow up in 2 weeks    Patient Instructions   Work letter given today  Use naproxen for 1 week with food  Ice your  knee  Keep leg elevated  Schedule an appointment with physical therapy  Start hydrochlorothiazide in the morning for your blood pressure  Monitor your blood pressure once a week at home.  Bring those readings on your next visit.  Notify us if your blood pressure readings consistently stays greater than 140/90.  Follow up in 2 weeks   Seek sooner medical attention if there is any worsening of symptoms or problems    The information in this document, created by the medical scribe for me, accurately reflects the services I personally performed and the decisions made by me. I have reviewed and approved this document for accuracy prior to leaving the patient care area.  February 28, 2019 4:01 PM    Loli Thao MD  [facility]

## 2019-03-01 NOTE — RESULT ENCOUNTER NOTE
Please notify patient by sending following letter with copy of test results      Viktor Lazo,    This is to inform you regarding your test result.    I tried to contact you but got the VM.  Your x-ray results shows no acute finding.  Minimal changes due to osteoarthritis    Sincerely,      Dr.Nasima Master MD,FACP

## 2019-03-07 ENCOUNTER — THERAPY VISIT (OUTPATIENT)
Dept: PHYSICAL THERAPY | Facility: CLINIC | Age: 59
End: 2019-03-07
Payer: COMMERCIAL

## 2019-03-07 DIAGNOSIS — M25.561 ACUTE PAIN OF RIGHT KNEE: ICD-10-CM

## 2019-03-07 PROCEDURE — 97110 THERAPEUTIC EXERCISES: CPT | Mod: GP | Performed by: PHYSICAL THERAPIST

## 2019-03-07 PROCEDURE — 97161 PT EVAL LOW COMPLEX 20 MIN: CPT | Mod: GP | Performed by: PHYSICAL THERAPIST

## 2019-03-07 NOTE — LETTER
[facility]  [address]  [facility] [address]  [phone]    2019    Re: Violet Nunez   :   1960  MRN:  [id number]   REFERRING PHYSICIAN:   Loli Thao    [facility]    Date of Initial Evaluation:  2019  Visits:  Rxs Used: 1  Reason for Referral:  Acute pain of right knee    EVALUATION SUMMARY    [facility] Initial Evaluation    Subjective:  The history is provided by the patient.   Violet Nunez is a 58 year old female with a right knee condition.  Condition occurred with:  Insidious onset.  Condition occurred: for unknown reasons.  This is a new condition  Pain began 3 weeks ago.   Order 19.    Patient reports pain:  Lateral, anterior and posterior.    Pain is described as aching and burning and is intermittent and reported as 3/10 and 5/10.  Associated symptoms:  Loss of motion/stiffness and loss of strength. Pain is worse in the A.M..  Symptoms are exacerbated by ascending stairs, descending stairs, bending/squatting, standing and walking and relieved by ice and NSAID's.  Since onset symptoms are unchanged.  Special tests:  X-ray.      General health as reported by patient is fair.  Pertinent medical history includes:  High blood pressure.  Medical allergies: no.  Surgical history: No previous surgeries, future LT knee orthopedic surgery.  Current medications:  High blood pressure medication.  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  Primary job tasks include:  Prolonged sitting, prolonged standing, repetitive tasks and lifting.  Barriers include:  None as reported by the patient.  Red flags:  None as reported by the patient.  Patient reported that she has a meniscal tear in her LT knee that will require surgery in the future.   General health as reported by patient is fair.   "Pertinent medical history includes:  Concussions/dizziness, overweight and stroke.      Current medications:  High blood pressure medication.  Current occupation is Housekeeping.      Knee Activity of Daily Living Score: 41.43                    Objective:  Standing Alignment:    Knee:  Normal  Gait:  Patient demonstrated slight \"limp\" during ambulation.   Gait Type:  Antalgic   Assistive Devices:  None        Re: Violet Nunez   :   1960    Ankle/Foot Evaluation  ROM:    Strength:    Dorsiflexion:  Left: 5/5     Pain:   Right: 5/5   Pain:  Hip Evaluation  HIP AROM:    Flexion: Left: 90    Right:   Hip PROM:    Flexion: Left: 90   Right: 90  Abduction: Left: < 40 degrees    Right: < 40 degrees  Internal Rotation: Left: mod to max loss    Right: mod to max loss  External Rotation: Left: min loss    Right: min loss  Endfeel: Patient has tight and painful endfeel with all hip PROM movements.   Hip Strength:    Flexion:   Left: 5-/5   Pain:  Right: 5-/5   Pain:  Extension:  Left: 4-/5  Pain:Right: 4-/5    Pain:    Abduction:  Left: 4/5     Pain:Right: 4/5    Pain:  Hip Special Testing:    Left hip positive for the following special tests:  Fadir/Labrum  Right hip positive for the following special tests:  Fadir/Labrum    Knee Evaluation:  ROM:    AROM  Flexion: Left: 115, pain at end range    Right: 122, pain at end range  PROM  Hyperextension: Left: 1    Right:  4  Pain: pain with overpressure flexion, negative on extension  Endfeel: normal  Strength:   Extension:  Left: 5-/5   Pain:      Right: 5-/5   Pain:  Flexion:  Left: 5/5   Pain:      Right: 5/5   Pain:    Quad Set Left:  Good and fair    Pain: -   Quad Set Right:  Good and fair    Pain: -  Ligament Testing:    Varus 0:  Left:  Neg   Right:  Neg  Varus 30:  Left:  Neg  Right:  Neg  Valgus 0:  Left:  Neg  Right:  Neg  Valgus 30:  Left:  Neg    Right:  Neg  Anterior Drawer:  Left:  Neg    Right:  Neg  Posterior Drawer: Left:  Neg  Right:  " Neg  Special Tests: Special test for knee: patient appeared to have increased patellar mobility RT>LT, reassess next session. (M-L)  Palpation:  Palpation of knee: pain near RT Pes Anserine, posterior knee and hamstring non specific.   Left knee tenderness present at:  Medial Joint Line  Right knee tenderness present at:  Medial Joint Line  Functional Testing:  : Patient demonstrated anterior knee excursion and slight knee valgus with DL Squat which produced pain in anterior knee. Patient unable to balance >10 second on either leg on SLB.       Re: Violet Nunez   :   1960    Assessment/Plan:    Patient is a 58 year old female with right side knee complaints.    Patient has the following significant findings with corresponding treatment plan.                Diagnosis 1:  Right knee pain    Pain -  hot/cold therapy and manual therapy  Decreased ROM/flexibility - manual therapy, therapeutic exercise and home program  Decreased strength - therapeutic exercise, therapeutic activities and home program  Impaired balance - neuro re-education, therapeutic activities and home program  Impaired muscle performance - neuro re-education and home program  Decreased function - therapeutic activities and home program  Therapy Evaluation Codes:   1) History comprised of:   Personal factors that impact the plan of care:      None.    Comorbidity factors that impact the plan of care are:      High blood pressure.     Medications impacting care: High blood pressure.  2) Examination of Body Systems comprised of:   Body structures and functions that impact the plan of care:      Hip and Knee.   Activity limitations that impact the plan of care are:      Squatting/kneeling, Stairs, Standing and Walking.  3) Clinical presentation characteristics are:   Stable/Uncomplicated.  4) Decision-Making    Low complexity using standardized patient assessment instrument and/or measureable assessment of functional outcome.  Cumulative  Therapy Evaluation is: Low complexity.    Previous and current functional limitations:  (See Goal Flow Sheet for this information)    Short term and Long term goals: (See Goal Flow Sheet for this information)   Communication ability:  Patient appears to be able to clearly communicate and understand verbal and written communication and follow directions correctly.  Treatment Explanation - The following has been discussed with the patient:   RX ordered/plan of care  Anticipated outcomes  Possible risks and side effects  This patient would benefit from PT intervention to resume normal activities.   Rehab potential is good.  Frequency:  1 X week, once daily  Duration:  for 6 weeks  Discharge Plan:  Achieve all LTG.  Independent in home treatment program.  Reach maximal therapeutic benefit.                       Thank you for your referral.    INQUIRIES  Therapist: Yousuf Khan DPT  [facility]  [address] [place] [address]  Phone: [phone]  Fax: [phone]

## 2019-03-07 NOTE — PROGRESS NOTES
"[facility] Initial Evaluation  Subjective:  The history is provided by the patient.   Violet Nunez is a 58 year old female with a right knee condition.  Condition occurred with:  Insidious onset.  Condition occurred: for unknown reasons.  This is a new condition  Pain began 3 weeks ago.   Order 2/28/19.    Patient reports pain:  Lateral, anterior and posterior.    Pain is described as aching and burning and is intermittent and reported as 3/10 and 5/10.  Associated symptoms:  Loss of motion/stiffness and loss of strength. Pain is worse in the A.M..  Symptoms are exacerbated by ascending stairs, descending stairs, bending/squatting, standing and walking and relieved by ice and NSAID's.  Since onset symptoms are unchanged.  Special tests:  X-ray.      General health as reported by patient is fair.  Pertinent medical history includes:  High blood pressure.  Medical allergies: no.  Surgical history: No previous surgeries, future LT knee orthopedic surgery.  Current medications:  High blood pressure medication.  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  Primary job tasks include:  Prolonged sitting, prolonged standing, repetitive tasks and lifting.    Barriers include:  None as reported by the patient.    Red flags:  None as reported by the patient.    Patient reported that she has a meniscal tear in her LT knee that will require surgery in the future.                     Objective:  Standing Alignment:              Knee:  Normal      Gait:  Patient demonstrated slight \"limp\" during ambulation.     Gait Type:  Antalgic   Assistive Devices:  None            Ankle/Foot Evaluation  ROM:        Strength:    Dorsiflexion:  Left: 5/5     Pain:   Right: 5/5   Pain:                                                                         Hip Evaluation  HIP AROM:    Flexion: Left: 90    Right:                   Hip PROM:    Flexion: Left: 90   Right: " 90    Abduction: Left: < 40 degrees    Right: < 40 degrees    Internal Rotation: Left: mod to max loss    Right: mod to max loss  External Rotation: Left: min loss    Right: min loss        Endfeel: Patient has tight and painful endfeel with all hip PROM movements.       Hip Strength:    Flexion:   Left: 5-/5   Pain:  Right: 5-/5   Pain:                    Extension:  Left: 4-/5  Pain:Right: 4-/5    Pain:    Abduction:  Left: 4/5     Pain:Right: 4/5    Pain:                  Hip Special Testing:    Left hip positive for the following special tests:  Fadir/Labrum   Right hip positive for the following special tests:  Fadir/Labrum           Knee Evaluation:  ROM:    AROM        Flexion: Left: 115, pain at end range    Right: 122, pain at end range  PROM    Hyperextension: Left: 1    Right:  4      Pain: pain with overpressure flexion, negative on extension  Endfeel: normal  Strength:     Extension:  Left: 5-/5   Pain:      Right: 5-/5   Pain:  Flexion:  Left: 5/5   Pain:      Right: 5/5   Pain:    Quad Set Left:  Good and fair    Pain: -   Quad Set Right:  Good and fair    Pain: -  Ligament Testing:    Varus 0:  Left:  Neg   Right:  Neg  Varus 30:  Left:  Neg  Right:  Neg  Valgus 0:  Left:  Neg  Right:  Neg  Valgus 30:  Left:  Neg    Right:  Neg  Anterior Drawer:  Left:  Neg    Right:  Neg  Posterior Drawer: Left:  Neg  Right:  Neg    Special Tests: Special test for knee: patient appeared to have increased patellar mobility RT>LT, reassess next session. (M-L)      Palpation:  Palpation of knee: pain near RT Pes Anserine, posterior knee and hamstring non specific.   Left knee tenderness present at:  Medial Joint Line  Right knee tenderness present at:  Medial Joint Line      Functional Testing:  : Patient demonstrated anterior knee excursion and slight knee valgus with DL Squat which produced pain in anterior knee. Patient unable to balance >10 second on either leg on SLB.                   General      ROS    Assessment/Plan:    Patient is a 58 year old female with right side knee complaints.    Patient has the following significant findings with corresponding treatment plan.                Diagnosis 1:  Right knee pain    Pain -  hot/cold therapy and manual therapy  Decreased ROM/flexibility - manual therapy, therapeutic exercise and home program  Decreased strength - therapeutic exercise, therapeutic activities and home program  Impaired balance - neuro re-education, therapeutic activities and home program  Impaired muscle performance - neuro re-education and home program  Decreased function - therapeutic activities and home program    Therapy Evaluation Codes:   1) History comprised of:   Personal factors that impact the plan of care:      None.    Comorbidity factors that impact the plan of care are:      High blood pressure.     Medications impacting care: High blood pressure.  2) Examination of Body Systems comprised of:   Body structures and functions that impact the plan of care:      Hip and Knee.   Activity limitations that impact the plan of care are:      Squatting/kneeling, Stairs, Standing and Walking.  3) Clinical presentation characteristics are:   Stable/Uncomplicated.  4) Decision-Making    Low complexity using standardized patient assessment instrument and/or measureable assessment of functional outcome.  Cumulative Therapy Evaluation is: Low complexity.    Previous and current functional limitations:  (See Goal Flow Sheet for this information)    Short term and Long term goals: (See Goal Flow Sheet for this information)     Communication ability:  Patient appears to be able to clearly communicate and understand verbal and written communication and follow directions correctly.  Treatment Explanation - The following has been discussed with the patient:   RX ordered/plan of care  Anticipated outcomes  Possible risks and side effects  This patient would benefit from PT intervention to resume  normal activities.   Rehab potential is good.    Frequency:  1 X week, once daily  Duration:  for 6 weeks  Discharge Plan:  Achieve all LTG.  Independent in home treatment program.  Reach maximal therapeutic benefit.    Please refer to the daily flowsheet for treatment today, total treatment time and time spent performing 1:1 timed codes.

## 2019-03-08 PROBLEM — M25.561 ACUTE PAIN OF RIGHT KNEE: Status: ACTIVE | Noted: 2019-03-08

## 2019-03-08 ASSESSMENT — ACTIVITIES OF DAILY LIVING (ADL)
KNEE_ACTIVITY_OF_DAILY_LIVING_SUM: 29
WALK: ACTIVITY IS FAIRLY DIFFICULT
WEAKNESS: I HAVE THE SYMPTOM BUT IT DOES NOT AFFECT MY ACTIVITY
LIMPING: THE SYMPTOM AFFECTS MY ACTIVITY SLIGHTLY
HOW_WOULD_YOU_RATE_THE_OVERALL_FUNCTION_OF_YOUR_KNEE_DURING_YOUR_USUAL_DAILY_ACTIVITIES?: NEARLY NORMAL
KNEEL ON THE FRONT OF YOUR KNEE: ACTIVITY IS VERY DIFFICULT
SIT WITH YOUR KNEE BENT: I AM UNABLE TO DO THE ACTIVITY
AS_A_RESULT_OF_YOUR_KNEE_INJURY,_HOW_WOULD_YOU_RATE_YOUR_CURRENT_LEVEL_OF_DAILY_ACTIVITY?: NEARLY NORMAL
RISE FROM A CHAIR: ACTIVITY IS FAIRLY DIFFICULT
STAND: ACTIVITY IS VERY DIFFICULT
SQUAT: I AM UNABLE TO DO THE ACTIVITY
SWELLING: THE SYMPTOM AFFECTS MY ACTIVITY SLIGHTLY
KNEE_ACTIVITY_OF_DAILY_LIVING_SCORE: 41.43
RAW_SCORE: 29
STIFFNESS: THE SYMPTOM AFFECTS MY ACTIVITY MODERATELY
GO DOWN STAIRS: ACTIVITY IS FAIRLY DIFFICULT
PAIN: THE SYMPTOM AFFECTS MY ACTIVITY SLIGHTLY
GIVING WAY, BUCKLING OR SHIFTING OF KNEE: I DO NOT HAVE THE SYMPTOM
HOW_WOULD_YOU_RATE_THE_CURRENT_FUNCTION_OF_YOUR_KNEE_DURING_YOUR_USUAL_DAILY_ACTIVITIES_ON_A_SCALE_FROM_0_TO_100_WITH_100_BEING_YOUR_LEVEL_OF_KNEE_FUNCTION_PRIOR_TO_YOUR_INJURY_AND_0_BEING_THE_INABILITY_TO_PERFORM_ANY_OF_YOUR_USUAL_DAILY_ACTIVITIES?: 50
GO UP STAIRS: ACTIVITY IS VERY DIFFICULT

## 2019-03-11 ENCOUNTER — OFFICE VISIT (OUTPATIENT)
Dept: FAMILY MEDICINE | Facility: CLINIC | Age: 59
End: 2019-03-11
Payer: COMMERCIAL

## 2019-03-11 VITALS
HEIGHT: 60 IN | HEART RATE: 72 BPM | WEIGHT: 157 LBS | OXYGEN SATURATION: 96 % | BODY MASS INDEX: 30.82 KG/M2 | SYSTOLIC BLOOD PRESSURE: 138 MMHG | TEMPERATURE: 98.6 F | DIASTOLIC BLOOD PRESSURE: 80 MMHG

## 2019-03-11 DIAGNOSIS — E78.2 MIXED HYPERLIPIDEMIA: ICD-10-CM

## 2019-03-11 DIAGNOSIS — M25.561 ACUTE PAIN OF RIGHT KNEE: ICD-10-CM

## 2019-03-11 DIAGNOSIS — I10 ESSENTIAL HYPERTENSION: Primary | ICD-10-CM

## 2019-03-11 PROCEDURE — 36415 COLL VENOUS BLD VENIPUNCTURE: CPT | Performed by: INTERNAL MEDICINE

## 2019-03-11 PROCEDURE — 80048 BASIC METABOLIC PNL TOTAL CA: CPT | Performed by: INTERNAL MEDICINE

## 2019-03-11 PROCEDURE — 99213 OFFICE O/P EST LOW 20 MIN: CPT | Performed by: INTERNAL MEDICINE

## 2019-03-11 RX ORDER — ATORVASTATIN CALCIUM 20 MG/1
20 TABLET, FILM COATED ORAL DAILY
Qty: 90 TABLET | Refills: 3 | Status: SHIPPED | OUTPATIENT
Start: 2019-03-11 | End: 2020-02-25

## 2019-03-11 ASSESSMENT — MIFFLIN-ST. JEOR: SCORE: 1208.65

## 2019-03-11 NOTE — PATIENT INSTRUCTIONS
I refilled your prescriptions  Monitor your blood pressure once a week  at home.  Bring those readings on your next visit.  Notify us if your blood pressure readings consistently stays greater than 140/90.  Follow up in 4 months  Seek sooner medical attention if there is any worsening of symptoms or problems

## 2019-03-11 NOTE — PROGRESS NOTES
SUBJECTIVE:   Violet Nunez is a 58 year old female who presents to clinic today for the following health issues:    Hypertension Follow-up      Outpatient blood pressures are being checked at home.  Results are vary.    Low Salt Diet: no added salt    Amount of exercise or physical activity: None    Problems taking medications regularly: No    Medication side effects: none    Diet: regular (no restrictions)    She didn't sleep well last night  Was stressed coming to office visit    Problem list and histories reviewed & adjusted, as indicated.  Additional history: as documented    Medications and labs reviewed in EPIC    Reviewed and updated as needed this visit by clinical staff  Tobacco  Allergies  Meds  Problems       Reviewed and updated as needed this visit by Provider       ROS:  Constitutional, HEENT, cardiovascular, pulmonary, GI, , musculoskeletal, neuro, skin, endocrine and psych systems are negative, except as otherwise noted.    This document serves as a record of the services and decisions personally performed and made by Loli Thao MD. It was created on her behalf by Liliane Sanchez, a trained medical scribe. The creation of this document is based on the provider's statements to the medical scribe.  Liliane Sanchez 3:10 PM March 11, 2019    OBJECTIVE:     /80   Pulse 72   Temp 98.6  F (37  C) (Tympanic)   Ht 1.524 m (5')   Wt 71.2 kg (157 lb)   SpO2 96%   BMI 30.66 kg/m    Body mass index is 30.66 kg/m .  GENERAL: healthy, alert and no distress  PSYCH: mentation appears normal, affect normal/bright    Diagnostic Test Results:  No results found for this or any previous visit (from the past 24 hour(s)).    ASSESSMENT/PLAN:     Violet was seen today for recheck.    Diagnoses and all orders for this visit:    Essential hypertension  -     Basic metabolic panel  Compliant with medication  However, BP was slightly elevated in office visit today  Reports home readings vary and  under better control  Advised healthy eating and exercise habits  Advised continuous monitoring and reporting readings above 140/90    Mixed hyperlipidemia  -     atorvastatin (LIPITOR) 20 MG tablet; Take 1 tablet (20 mg) by mouth daily  Explained to patient that statins prevent plaque formation  Prescribed atorvastatin    Acute pain of right knee  Patient came in for office visit on 02/28/19 for right knee pain  XR was normal  She has been going for PT  It's been helping her improve  Advised patient to continue PT  If she doesn't improve, I will refer to orthopedics    Patient Instructions   I refilled your prescriptions  Monitor your blood pressure once a week  at home.  Bring those readings on your next visit.  Notify us if your blood pressure readings consistently stays greater than 140/90.  Follow up in 4 months  Seek sooner medical attention if there is any worsening of symptoms or problems    The information in this document, created by the medical scribe for me, accurately reflects the services I personally performed and the decisions made by me. I have reviewed and approved this document for accuracy prior to leaving the patient care area.  March 11, 2019 3:25 PM    Loli Thao MD  [facility]

## 2019-03-11 NOTE — LETTER
[address]  [address]  [place]  [address]  Tel: [phone]    March 13, 2019    Violet Nunez  [address] [place] [id number]        Dear Ms. Nunez,    This is to inform you regarding your test result.    Basic metabolic panel which includes electrolytes and kidney fucntion is normal  Your potassium is slightly on the low end of normal  This is due to your water pill hydrochlorothiazide  Eat food rich in potassium  Or you can take over-the-counter potassium tablet also.    Sincerely,    Loli Thao MD/KELLY          Enclosure: Lab Results  Results for orders placed or performed in visit on 03/11/19   Basic metabolic panel   Result Value Ref Range    Sodium 133 133 - 144 mmol/L    Potassium 3.5 3.4 - 5.3 mmol/L    Chloride 98 94 - 109 mmol/L    Carbon Dioxide 27 20 - 32 mmol/L    Anion Gap 8 3 - 14 mmol/L    Glucose 91 70 - 99 mg/dL    Urea Nitrogen 23 7 - 30 mg/dL    Creatinine 0.58 0.52 - 1.04 mg/dL    GFR Estimate >90 >60 mL/min/[1.73_m2]    GFR Estimate If Black >90 >60 mL/min/[1.73_m2]    Calcium 9.5 8.5 - 10.1 mg/dL

## 2019-03-12 LAB
ANION GAP SERPL CALCULATED.3IONS-SCNC: 8 MMOL/L (ref 3–14)
BUN SERPL-MCNC: 23 MG/DL (ref 7–30)
CALCIUM SERPL-MCNC: 9.5 MG/DL (ref 8.5–10.1)
CHLORIDE SERPL-SCNC: 98 MMOL/L (ref 94–109)
CO2 SERPL-SCNC: 27 MMOL/L (ref 20–32)
CREAT SERPL-MCNC: 0.58 MG/DL (ref 0.52–1.04)
GFR SERPL CREATININE-BSD FRML MDRD: >90 ML/MIN/{1.73_M2}
GLUCOSE SERPL-MCNC: 91 MG/DL (ref 70–99)
POTASSIUM SERPL-SCNC: 3.5 MMOL/L (ref 3.4–5.3)
SODIUM SERPL-SCNC: 133 MMOL/L (ref 133–144)

## 2019-03-12 NOTE — PROGRESS NOTES
[facility] Initial Evaluation  Subjective:                                     General health as reported by patient is fair.  Pertinent medical history includes:  Concussions/dizziness, overweight and stroke.      Current medications:  High blood pressure medication.  Current occupation is Housekeeping.                       Knee Activity of Daily Living Score: 41.43            Objective:  System    Physical Exam    General     ROS    Assessment/Plan:

## 2019-03-13 NOTE — RESULT ENCOUNTER NOTE
Please notify patient by sending following letter with copy of test results      Viktor Lazo,    This is to inform you regarding your test result.    Basic metabolic panel which includes electrolytes and kidney fucntion is normal  Your potassium is slightly on the low end of normal  This is due to your water pill hydrochlorothiazide  Eat food rich in potassium  Or you can take over-the-counter potassium tablet also.    Sincerely,      Dr.Nasima Master MD,FACP

## 2019-03-25 ENCOUNTER — TELEPHONE (OUTPATIENT)
Dept: FAMILY MEDICINE | Facility: CLINIC | Age: 59
End: 2019-03-25

## 2019-03-25 ENCOUNTER — TRANSFERRED RECORDS (OUTPATIENT)
Dept: HEALTH INFORMATION MANAGEMENT | Facility: CLINIC | Age: 59
End: 2019-03-25

## 2019-03-25 NOTE — TELEPHONE ENCOUNTER
Reason for Call:  Copy of Xray     Detailed comments:  Pt was seen on 02/28/2019 for xrays.  She is requesting that she get a copy.  Please call pt when ready for .      Phone Number Patient can be reached at:  [phone]    Best Time: Any     Can we leave a detailed message on this number? YES    Call taken on 3/25/2019 at 11:21 AM by Kaity White

## 2019-03-25 NOTE — TELEPHONE ENCOUNTER
Daughter came to clinic before CD was ready.  Xray tech interrupted her duties to make CD for patient/daughter.  C2C on file for daughter Mc.    Jill Gaines RT (R)

## 2019-05-02 ENCOUNTER — TRANSFERRED RECORDS (OUTPATIENT)
Dept: HEALTH INFORMATION MANAGEMENT | Facility: CLINIC | Age: 59
End: 2019-05-02

## 2019-06-24 ENCOUNTER — OFFICE VISIT (OUTPATIENT)
Dept: FAMILY MEDICINE | Facility: CLINIC | Age: 59
End: 2019-06-24
Payer: COMMERCIAL

## 2019-06-24 ENCOUNTER — TELEPHONE (OUTPATIENT)
Dept: FAMILY MEDICINE | Facility: CLINIC | Age: 59
End: 2019-06-24

## 2019-06-24 VITALS
SYSTOLIC BLOOD PRESSURE: 139 MMHG | WEIGHT: 157 LBS | DIASTOLIC BLOOD PRESSURE: 84 MMHG | TEMPERATURE: 97.2 F | HEIGHT: 60 IN | OXYGEN SATURATION: 100 % | BODY MASS INDEX: 30.82 KG/M2 | HEART RATE: 81 BPM

## 2019-06-24 DIAGNOSIS — G89.29 CHRONIC PAIN OF RIGHT KNEE: ICD-10-CM

## 2019-06-24 DIAGNOSIS — Z01.818 PREOP GENERAL PHYSICAL EXAM: Primary | ICD-10-CM

## 2019-06-24 DIAGNOSIS — I10 ESSENTIAL HYPERTENSION: ICD-10-CM

## 2019-06-24 DIAGNOSIS — M25.561 CHRONIC PAIN OF RIGHT KNEE: ICD-10-CM

## 2019-06-24 DIAGNOSIS — E78.2 MIXED HYPERLIPIDEMIA: ICD-10-CM

## 2019-06-24 DIAGNOSIS — H26.9 CATARACT OF RIGHT EYE, UNSPECIFIED CATARACT TYPE: ICD-10-CM

## 2019-06-24 LAB
ERYTHROCYTE [DISTWIDTH] IN BLOOD BY AUTOMATED COUNT: 13.2 % (ref 10–15)
HCT VFR BLD AUTO: 39.5 % (ref 35–47)
HGB BLD-MCNC: 13.1 G/DL (ref 11.7–15.7)
MCH RBC QN AUTO: 29.2 PG (ref 26.5–33)
MCHC RBC AUTO-ENTMCNC: 33.2 G/DL (ref 31.5–36.5)
MCV RBC AUTO: 88 FL (ref 78–100)
PLATELET # BLD AUTO: 202 10E9/L (ref 150–450)
RBC # BLD AUTO: 4.48 10E12/L (ref 3.8–5.2)
WBC # BLD AUTO: 9.3 10E9/L (ref 4–11)

## 2019-06-24 PROCEDURE — 36415 COLL VENOUS BLD VENIPUNCTURE: CPT | Performed by: INTERNAL MEDICINE

## 2019-06-24 PROCEDURE — 99215 OFFICE O/P EST HI 40 MIN: CPT | Performed by: INTERNAL MEDICINE

## 2019-06-24 PROCEDURE — 85027 COMPLETE CBC AUTOMATED: CPT | Performed by: INTERNAL MEDICINE

## 2019-06-24 PROCEDURE — 93000 ELECTROCARDIOGRAM COMPLETE: CPT | Performed by: INTERNAL MEDICINE

## 2019-06-24 PROCEDURE — 80048 BASIC METABOLIC PNL TOTAL CA: CPT | Performed by: INTERNAL MEDICINE

## 2019-06-24 ASSESSMENT — MIFFLIN-ST. JEOR: SCORE: 1208.65

## 2019-06-24 NOTE — TELEPHONE ENCOUNTER
Reason for Call:  Pre-Op info     Detailed comments: Please fax over Pre-Op in to [facility] patient has surgery 1st thing Tuesday   Morning 6/25  Fax # [phone]        Call taken on 6/24/2019 at 3:49 PM by Charlie Sosa

## 2019-06-24 NOTE — PROGRESS NOTES
[place] [address] [address] [facility] [address]  [phone]  Dept: [phone]    PRE-OP EVALUATION:  Today's date: 2019    Violet Nunez (: 1960) presents for pre-operative evaluation assessment as requested by  (does don recall name).  She requires evaluation and anesthesia risk assessment prior to undergoing surgery/procedure for treatment of cataract .    Proposed Surgery/ Procedure: repair tear behind right knee    (will  ALSO be having Cataract surgery left eye 7-10-19 at [facility]  Fax [phone], Right eye 2 weeks after that)    Date of Surgery/   Time of Surgery/ Procedure: 9 AM arrival   Hospital/Surgical Facility: [facility]  Fax number for surgical facility: [phone], [phone]   Primary Physician: Loli Thao  Type of Anesthesia Anticipated: to be determined    Patient has a Health Care Directive or Living Will:  NO    1. NO - Do you have a history of heart attack, stroke, stent, bypass or surgery on an artery in the head, neck, heart or legs?  2. NO - Do you ever have any pain or discomfort in your chest?  3. NO - Do you have a history of  Heart Failure?  4. NO - Are you troubled by shortness of breath when: walking on the level, up a slight hill or at night?  5. NO - Do you currently have a cold, bronchitis or other respiratory infection?  6. NO - Do you have a cough, shortness of breath or wheezing?  7. NO - Do you sometimes get pains in the calves of your legs when you walk?  8. NO - Do you or anyone in your family have previous history of blood clots?  9. NO - Do you or does anyone in your family have a serious bleeding problem such as prolonged bleeding following surgeries or cuts?  10. NO - Have you ever had problems with anemia or been told to take iron pills?  11. NO - Have you had any abnormal blood loss such as black, tarry or bloody stools, or abnormal vaginal bleeding?  12. YES - HAVE YOU EVER HAD A  BLOOD TRANSFUSION? While giving birth a long time ago  13. NO - Have you or any of your relatives ever had problems with anesthesia?  14. NO - Do you have sleep apnea, excessive snoring or daytime drowsiness?  15. NO - Do you have any prosthetic heart valves?  16. NO - Do you have prosthetic joints?  17. NO - Is there any chance that you may be pregnant?      HPI:     HPI related to upcoming procedure:     Patient is having right knee surgery on 06/25/19 at [facility]  She has a right meniscal tear  Has seen Dr. Clifford to plan her procedure  She also is schedule for left cataract surgery on 07/10/19 and right cataract surgery 2 weeks later  No history of anesthesia complications  No family history of anesthesia complications    See problem list for active medical problems.  Problems all longstanding and stable, except as noted/documented.  See ROS for pertinent symptoms related to these conditions.    HYPERLIPIDEMIA :Patient has a long history of significant Hyperlipidemia requiring medication for treatment with recent good control. Patient reports no problems or side effects with the medication.     HYPERTENSION : Patient has longstanding history of HTN , currently denies any symptoms referable to elevated blood pressure. Specifically denies chest pain, palpitations, dyspnea, orthopnea, PND or peripheral edema. Blood pressure readings have been in normal range. Current medication regimen is as listed below. Patient denies any side effects of medication.       MEDICAL HISTORY:     Patient Active Problem List    Diagnosis Date Noted     Acute pain of right knee 03/08/2019     Priority: Medium     Mixed hyperlipidemia 12/05/2016     Priority: Medium     Cervical high risk HPV (human papillomavirus) test positive 11/17/2016     Priority: Medium     11/17/16: NIL pap, + HR HPV (not 16 or 18) result. Plan cotest in 1 year.  12/22/17 NIL pap, neg HR HPV. Plan 3 year cotest       Obesity (BMI 30-39.9)  08/02/2013     Priority: Medium     Snoring 08/02/2013     Priority: Medium     Essential hypertension 07/30/2013     Priority: Medium     CARDIOVASCULAR SCREENING; LDL GOAL LESS THAN 130 07/30/2013     Priority: Medium     Vitamin D deficiency 09/21/2012     Priority: Medium     Tendonitis of wrist, left 09/21/2012     Priority: Medium      Past Medical History:   Diagnosis Date     Cervical high risk HPV (human papillomavirus) test positive 11/17/16    (not 16 or 18)     Hypertension      Past Surgical History:   Procedure Laterality Date     COLONOSCOPY  9/9/2013    Procedure: COLONOSCOPY;  COLONOSCOPY ;  Surgeon: Marcelino Crook MD;  Location:  GI     TUBAL/ECTOPIC PREGNANCY       Current Outpatient Medications   Medication Sig Dispense Refill     atorvastatin (LIPITOR) 20 MG tablet Take 1 tablet (20 mg) by mouth daily 90 tablet 3     Cholecalciferol (VITAMIN D) 2000 UNIT CAPS Take 1 tablet by mouth daily.       hydrochlorothiazide (HYDRODIURIL) 12.5 MG tablet Take 1 tablet (12.5 mg) by mouth daily 90 tablet 1     metoprolol succinate ER (TOPROL-XL) 100 MG 24 hr tablet Take 1 tablet (100 mg) by mouth daily 90 tablet 3     OTC products: None, except as noted above    Allergies   Allergen Reactions     No Known Allergies       Latex Allergy: NO    Social History     Tobacco Use     Smoking status: Never Smoker     Smokeless tobacco: Never Used   Substance Use Topics     Alcohol use: No     Alcohol/week: 0.0 oz     History   Drug Use No       REVIEW OF SYSTEMS:   CONSTITUTIONAL: NEGATIVE for fever, chills, change in weight  INTEGUMENTARY/SKIN: NEGATIVE for worrisome rashes, moles or lesions  EYES: POSITIVE for bilateral vision changes  ENT/MOUTH: NEGATIVE for ear, mouth and throat problems  RESP: NEGATIVE for significant cough or SOB  BREAST: NEGATIVE for masses, tenderness or discharge  CV: NEGATIVE for chest pain, palpitations or peripheral edema  GI: NEGATIVE for nausea, abdominal pain, heartburn, or change  in bowel habits  : NEGATIVE for frequency, dysuria, or hematuria  MUSCULOSKELETAL: POSITIVE for right knee pain  NEURO: NEGATIVE for weakness, dizziness or paresthesias  ENDOCRINE: NEGATIVE for temperature intolerance, skin/hair changes  HEME: NEGATIVE for bleeding problems  PSYCHIATRIC: NEGATIVE for changes in mood or affect    This document serves as a record of the services and decisions personally performed and made by Loli Thao MD. It was created on her behalf by Liliane Sanchez, a trained medical scribe. The creation of this document is based on the provider's statements to the medical scribe.  Liliane Sanchez 3:51 PM June 24, 2019    EXAM:   /84   Pulse 81   Temp 97.2  F (36.2  C) (Oral)   Ht 1.524 m (5')   Wt 71.2 kg (157 lb)   SpO2 100%   BMI 30.66 kg/m      GENERAL APPEARANCE: healthy, alert and no distress     EYES: EOMI, PERRL     HENT: ear canals and TM's normal and nose and mouth without ulcers or lesions     NECK: no adenopathy, no asymmetry, masses, or scars and thyroid normal to palpation     RESP: lungs clear to auscultation - no rales, rhonchi or wheezes     CV: regular rates and rhythm, normal S1 S2, no S3 or S4 and no murmur, click or rub     ABDOMEN:  soft, nontender, no HSM or masses and bowel sounds normal     MS: extremities normal-changes due to osteoarthritis in her joints     SKIN: no suspicious lesions or rashes     NEURO: Normal strength and tone, sensory exam grossly normal, mentation intact and speech normal     PSYCH: mentation appears normal. and affect normal/bright     LYMPHATICS: No cervical adenopathy    DIAGNOSTICS:     EKG: appears normal, NSR, normal axis, normal intervals, no acute ST/T changes c/w ischemia, no LVH by voltage criteria, unchanged from previous tracings  Labs Resulted Today:   Results for orders placed or performed in visit on 06/24/19   CBC with platelets   Result Value Ref Range    WBC 9.3 4.0 - 11.0 10e9/L    RBC Count 4.48 3.8 - 5.2  10e12/L    Hemoglobin 13.1 11.7 - 15.7 g/dL    Hematocrit 39.5 35.0 - 47.0 %    MCV 88 78 - 100 fl    MCH 29.2 26.5 - 33.0 pg    MCHC 33.2 31.5 - 36.5 g/dL    RDW 13.2 10.0 - 15.0 %    Platelet Count 202 150 - 450 10e9/L       Recent Labs   Lab Test 03/11/19  1536 12/27/18  0716  10/31/15  1002 10/02/14  0954   HGB  --  12.9  --  13.2  --    PLT  --  196  --  176  --     141   < > 139 140   POTASSIUM 3.5 3.9   < > 4.4 4.1   CR 0.58 0.61   < > 0.59 0.61   A1C  --   --   --   --  5.2    < > = values in this interval not displayed.        IMPRESSION:   Reason for surgery/procedure: Right knee repair, bilateral cataract surgeries  Diagnosis/reason for consult: Pre-op clearance    The proposed surgical procedure is considered INTERMEDIATE risk for knee surgery.  The proposed surgical procedure is considered LOW risk for cataract surgeries.    REVISED CARDIAC RISK INDEX  The patient has the following serious cardiovascular risks for perioperative complications such as (MI, PE, VFib and 3  AV Block):  No serious cardiac risks  INTERPRETATION: 0 risks: Class I (very low risk - 0.4% complication rate)    The patient has the following additional risks for perioperative complications:  No identified additional risks    Violet was seen today for pre-op exam.    Diagnoses and all orders for this visit:    Preop general physical exam  -     EKG 12-lead complete w/read - Clinics  -     CBC with platelets  Labs and EKG ordered for knee surgery as it's intermediate risk    Chronic pain of right knee  -     EKG 12-lead complete w/read - Clinics  Patient is having right knee surgery on 06/25/19 at [facility]  She has a right meniscal tear  Has seen Dr. Clifford to plan her procedure  She also is schedule for left cataract surgery on 07/10/19 and right cataract surgery 2 weeks later  No history of anesthesia complications  No family history of anesthesia complications  See note from [facility] on  05/02/19 for more info    Cataract of right eye, unspecified cataract type  See above    Mixed hyperlipidemia  Doing well  Compliant with medication    Essential hypertension  -     Basic metabolic panel  Doing well  She has been compliant with her metoprolol but noncompliant with her hydrochlorothiazide  She misses that frequently but despite that per patient report her blood pressure stays under good control at home  Patient is checking BP at home  They are within normal ranges  Reports BP today is high due to not sleeping  She was at a party late last night  Advised holding hydrochlorothiazide on morning of knee procedure    RECOMMENDATIONS:     Patient is OPTIMAL for upcoming procedure    --Patient is to take all scheduled medications on the day of surgery EXCEPT for modifications listed below.    Hydrochlorothiazide on the morning of surgery     Patient Instructions   Labs and EKG  Hold hydrochlorothiazide on morning of the surgery  Avoid aspirin 7 days before the surgery.   Avoid nonsteroidal anti-inflammatory pain medication like ibuprofen, Motrin, or Aleve 3 days before the surgery  Tylenol is okay to use for pain  Avoid any OTC multivitamins or herbal supplement 7 days before surgery   Resume after surgery  Follow up in 3 months  Seek sooner medical attention if there is any worsening of symptoms or problems  The information in this document, created by the medical scribe for me, accurately reflects the services I personally performed and the decisions made by me. I have reviewed and approved this document for accuracy prior to leaving the patient care area.  June 24, 2019 4:05 PM    Signed Electronically by: Loli Thao MD    Copy of this evaluation report is provided to requesting physician.    [facility] Preop Guidelines    Revised Cardiac Risk Index

## 2019-06-24 NOTE — LETTER
[address]  [address]  [place]  [address]  Tel: [phone]    June 26, 2019    Violet Nunez  [address] [facility] [address]        Dear Ms. Nunez,    Viktor Lazo,     This is to inform you regarding your test result.     CBC result which includes white count Hemoglobin and  Platelet Counts is normal.   Basic metabolic panel which includes electrolytes and kidney fucntion is normal       Sincerely,       Dr.Nasima Master MD,FACP / leatha        Resulted Orders   CBC with platelets   Result Value Ref Range    WBC 9.3 4.0 - 11.0 10e9/L    RBC Count 4.48 3.8 - 5.2 10e12/L    Hemoglobin 13.1 11.7 - 15.7 g/dL    Hematocrit 39.5 35.0 - 47.0 %    MCV 88 78 - 100 fl    MCH 29.2 26.5 - 33.0 pg    MCHC 33.2 31.5 - 36.5 g/dL    RDW 13.2 10.0 - 15.0 %    Platelet Count 202 150 - 450 10e9/L   Basic metabolic panel   Result Value Ref Range    Sodium 140 133 - 144 mmol/L    Potassium 3.8 3.4 - 5.3 mmol/L    Chloride 106 94 - 109 mmol/L    Carbon Dioxide 21 20 - 32 mmol/L    Anion Gap 13 3 - 14 mmol/L    Glucose 85 70 - 99 mg/dL      Comment:      Non Fasting    Urea Nitrogen 21 7 - 30 mg/dL    Creatinine 0.66 0.52 - 1.04 mg/dL    GFR Estimate >90 >60 mL/min/[1.73_m2]      Comment:      Non  GFR Calc  Starting 12/18/2018, serum creatinine based estimated GFR (eGFR) will be   calculated using the Chronic Kidney Disease Epidemiology Collaboration   (CKD-EPI) equation.      GFR Estimate If Black >90 >60 mL/min/[1.73_m2]      Comment:       GFR Calc  Starting 12/18/2018, serum creatinine based estimated GFR (eGFR) will be   calculated using the Chronic Kidney Disease Epidemiology Collaboration   (CKD-EPI) equation.      Calcium 9.4 8.5 - 10.1 mg/dL

## 2019-06-24 NOTE — PATIENT INSTRUCTIONS
Before Your Surgery      Call your surgeon if there is any change in your health. This includes signs of a cold or flu (such as a sore throat, runny nose, cough, rash or fever).    Do not smoke, drink alcohol or take over the counter medicine (unless your surgeon or primary care doctor tells you to) for the 24 hours before and after surgery.    If you take prescribed drugs: Follow your doctor s orders about which medicines to take and which to stop until after surgery.    Eating and drinking prior to surgery: follow the instructions from your surgeon    Take a shower or bath the night before surgery. Use the soap your surgeon gave you to gently clean your skin. If you do not have soap from your surgeon, use your regular soap. Do not shave or scrub the surgery site.  Wear clean pajamas and have clean sheets on your bed.     Labs and EKG  Hold hydrochlorothiazide on morning of the surgery  Avoid aspirin 7 days before the surgery.   Avoid nonsteroidal anti-inflammatory pain medication like ibuprofen, Motrin, or Aleve 3 days before the surgery  Tylenol is okay to use for pain  Avoid any OTC multivitamins or herbal supplement 7 days before surgery   Resume after surgery  Follow up in 3 months  Seek sooner medical attention if there is any worsening of symptoms or problems

## 2019-06-24 NOTE — PROGRESS NOTES
"Ashvin Nunez is a 58 year old female who presents to clinic today for the following health issues:    HPI   {SUPERLIST (Optional):273432}  {additonal problems for provider to add (Optional):941920}    {HIST REVIEW/ LINKS 2 (Optional):793215}    Reviewed and updated as needed this visit by Provider         Review of Systems   {ROS COMP (Optional):252628}      Objective    There were no vitals taken for this visit.  There is no height or weight on file to calculate BMI.  Physical Exam   {Exam List (Optional):380440}    {Diagnostic Test Results (Optional):953195::\"Diagnostic Test Results:\",\"Labs reviewed in Epic\"}        {PROVIDER CHARTING PREFERENCE:804086}    "

## 2019-06-25 LAB
ANION GAP SERPL CALCULATED.3IONS-SCNC: 13 MMOL/L (ref 3–14)
BUN SERPL-MCNC: 21 MG/DL (ref 7–30)
CALCIUM SERPL-MCNC: 9.4 MG/DL (ref 8.5–10.1)
CHLORIDE SERPL-SCNC: 106 MMOL/L (ref 94–109)
CO2 SERPL-SCNC: 21 MMOL/L (ref 20–32)
CREAT SERPL-MCNC: 0.66 MG/DL (ref 0.52–1.04)
GFR SERPL CREATININE-BSD FRML MDRD: >90 ML/MIN/{1.73_M2}
GLUCOSE SERPL-MCNC: 85 MG/DL (ref 70–99)
POTASSIUM SERPL-SCNC: 3.8 MMOL/L (ref 3.4–5.3)
SODIUM SERPL-SCNC: 140 MMOL/L (ref 133–144)

## 2019-06-26 NOTE — RESULT ENCOUNTER NOTE
Please notify patient by sending following letter with copy of test results      Viktor Lazo,    This is to inform you regarding your test result.    CBC result which includes white count Hemoglobin and  Platelet Counts is normal.   Basic metabolic panel which includes electrolytes and kidney fucntion is normal      Sincerely,      Dr.Nasima Master MD,FACP

## 2019-07-08 ENCOUNTER — TRANSFERRED RECORDS (OUTPATIENT)
Dept: HEALTH INFORMATION MANAGEMENT | Facility: CLINIC | Age: 59
End: 2019-07-08

## 2019-07-09 NOTE — TELEPHONE ENCOUNTER
This preop is to be faxed to [facility].  Please fax to [phone] as soon as possible.  Procedure is tomorrow.

## 2019-07-13 PROBLEM — M25.561 ACUTE PAIN OF RIGHT KNEE: Status: RESOLVED | Noted: 2019-03-08 | Resolved: 2019-07-13

## 2019-07-14 NOTE — PROGRESS NOTES
DISCHARGE REPORT     Patient seen one time for evaluation and treat. Please see initial evaluation for discharge information. Episode to be closed at this time and patient formally discharged from therapy.        Yousuf Khan, MARBINT, CSCS

## 2019-08-07 ENCOUNTER — TRANSFERRED RECORDS (OUTPATIENT)
Dept: HEALTH INFORMATION MANAGEMENT | Facility: CLINIC | Age: 59
End: 2019-08-07

## 2019-09-09 ENCOUNTER — TRANSFERRED RECORDS (OUTPATIENT)
Dept: HEALTH INFORMATION MANAGEMENT | Facility: CLINIC | Age: 59
End: 2019-09-09

## 2019-09-11 DIAGNOSIS — I10 ESSENTIAL HYPERTENSION: ICD-10-CM

## 2019-09-11 RX ORDER — HYDROCHLOROTHIAZIDE 12.5 MG/1
TABLET ORAL
Qty: 90 TABLET | Refills: 1 | Status: SHIPPED | OUTPATIENT
Start: 2019-09-11 | End: 2020-02-25

## 2019-09-11 NOTE — TELEPHONE ENCOUNTER
"hydrochlorothiazide (HYDRODIURIL) 12.5 MG tablet 90 tablet 1 2/28/2019     Last Written Prescription Date:  2/28/19  Last Fill Quantity: 90,  # refills: 1   Last office visit: 6/24/2019 with prescribing provider:  Master   Future Office Visit:  None    Requested Prescriptions   Pending Prescriptions Disp Refills     hydrochlorothiazide (HYDRODIURIL) 12.5 MG tablet [Pharmacy Med Name: HYDROCHLOROTHIAZIDE 12.5MG TABLETS] 90 tablet 0     Sig: TAKE 1 TABLET(12.5 MG) BY MOUTH DAILY       Diuretics (Including Combos) Protocol Passed - 9/11/2019  5:16 AM        Passed - Blood pressure under 140/90 in past 12 months     BP Readings from Last 3 Encounters:   06/24/19 139/84   03/11/19 138/80   02/28/19 150/70                 Passed - Recent (12 mo) or future (30 days) visit within the authorizing provider's specialty     Patient had office visit in the last 12 months or has a visit in the next 30 days with authorizing provider or within the authorizing provider's specialty.  See \"Patient Info\" tab in inbasket, or \"Choose Columns\" in Meds & Orders section of the refill encounter.              Passed - Medication is active on med list        Passed - Patient is age 18 or older        Passed - No active pregancy on record        Passed - Normal serum creatinine on file in past 12 months     Recent Labs   Lab Test 06/24/19  1628   CR 0.66              Passed - Normal serum potassium on file in past 12 months     Recent Labs   Lab Test 06/24/19  1628   POTASSIUM 3.8                    Passed - Normal serum sodium on file in past 12 months     Recent Labs   Lab Test 06/24/19  1628                 Passed - No positive pregnancy test in past 12 months        No flowsheet data found.    "

## 2019-10-16 ENCOUNTER — TRANSFERRED RECORDS (OUTPATIENT)
Dept: HEALTH INFORMATION MANAGEMENT | Facility: CLINIC | Age: 59
End: 2019-10-16

## 2019-12-10 ENCOUNTER — ANCILLARY PROCEDURE (OUTPATIENT)
Dept: GENERAL RADIOLOGY | Facility: CLINIC | Age: 59
End: 2019-12-10
Attending: INTERNAL MEDICINE
Payer: COMMERCIAL

## 2019-12-10 ENCOUNTER — OFFICE VISIT (OUTPATIENT)
Dept: FAMILY MEDICINE | Facility: CLINIC | Age: 59
End: 2019-12-10
Payer: COMMERCIAL

## 2019-12-10 VITALS
BODY MASS INDEX: 32.39 KG/M2 | WEIGHT: 165 LBS | SYSTOLIC BLOOD PRESSURE: 139 MMHG | OXYGEN SATURATION: 98 % | TEMPERATURE: 98.8 F | DIASTOLIC BLOOD PRESSURE: 78 MMHG | HEART RATE: 73 BPM | HEIGHT: 60 IN

## 2019-12-10 DIAGNOSIS — R05.9 COUGH: Primary | ICD-10-CM

## 2019-12-10 DIAGNOSIS — F32.9 REACTIVE DEPRESSION: ICD-10-CM

## 2019-12-10 DIAGNOSIS — R05.9 COUGH: ICD-10-CM

## 2019-12-10 DIAGNOSIS — J20.9 BRONCHITIS WITH BRONCHOSPASM: ICD-10-CM

## 2019-12-10 PROCEDURE — 99214 OFFICE O/P EST MOD 30 MIN: CPT | Performed by: INTERNAL MEDICINE

## 2019-12-10 PROCEDURE — 71046 X-RAY EXAM CHEST 2 VIEWS: CPT

## 2019-12-10 RX ORDER — ALBUTEROL SULFATE 90 UG/1
2 AEROSOL, METERED RESPIRATORY (INHALATION) EVERY 4 HOURS PRN
Qty: 1 INHALER | Refills: 0 | Status: SHIPPED | OUTPATIENT
Start: 2019-12-10 | End: 2019-12-17

## 2019-12-10 RX ORDER — AZITHROMYCIN 250 MG/1
TABLET, FILM COATED ORAL
Qty: 6 TABLET | Refills: 0 | Status: SHIPPED | OUTPATIENT
Start: 2019-12-10 | End: 2019-12-17

## 2019-12-10 RX ORDER — FAMOTIDINE 20 MG/1
20 TABLET, FILM COATED ORAL 2 TIMES DAILY
Qty: 60 TABLET | Refills: 0 | Status: SHIPPED | OUTPATIENT
Start: 2019-12-10 | End: 2020-01-07

## 2019-12-10 RX ORDER — PREDNISONE 20 MG/1
20 TABLET ORAL DAILY
Qty: 5 TABLET | Refills: 0 | Status: SHIPPED | OUTPATIENT
Start: 2019-12-10 | End: 2019-12-17

## 2019-12-10 RX ORDER — BENZONATATE 200 MG/1
200 CAPSULE ORAL 3 TIMES DAILY PRN
Qty: 30 CAPSULE | Refills: 1 | Status: SHIPPED | OUTPATIENT
Start: 2019-12-10 | End: 2020-02-25

## 2019-12-10 ASSESSMENT — PATIENT HEALTH QUESTIONNAIRE - PHQ9: SUM OF ALL RESPONSES TO PHQ QUESTIONS 1-9: 8

## 2019-12-10 ASSESSMENT — MIFFLIN-ST. JEOR: SCORE: 1239.94

## 2019-12-10 NOTE — PATIENT INSTRUCTIONS
Drink plenty of fluids.  Take extra rest.  Use albuterol inhaler 2 puffs every 4 hours as needed for cough  Take tessalon pearls as needed for cough  Take antibiotics as prescribed  Take prednisone with food  Chest XR today  Follow up in one week  Seek sooner medical attention if there is any worsening of symptoms or problems.

## 2019-12-10 NOTE — PROGRESS NOTES
Subjective     Violet Nunez is a 59 year old female who presents to clinic today for the following health issues:    HPI     URI started 1 month ago - had congestion at the beginning, currently has only a cough with some wheezing. Cough gets very bad, is productive. No other URI symptoms other than cough now. Has tried multiple OTC medications, robitussin, Nyquil, cough drops without relief. Cough is worse at night.       Patient reports that she gets coughing spells  Sometimes she coughs so hard that she feels like going to vomit.  Denied any high-grade fever  In the past when she had issues like that she use nasal spray which helped her  This time it is not working for her.  She is coughing so much that sometimes she  leaks urine    Patient Active Problem List   Diagnosis     Vitamin D deficiency     Tendonitis of wrist, left     Essential hypertension     CARDIOVASCULAR SCREENING; LDL GOAL LESS THAN 130     Obesity (BMI 30-39.9)     Snoring     Cervical high risk HPV (human papillomavirus) test positive     Mixed hyperlipidemia     Reactive depression     Past Surgical History:   Procedure Laterality Date     COLONOSCOPY  9/9/2013    Procedure: COLONOSCOPY;  COLONOSCOPY ;  Surgeon: Marcelino Crook MD;  Location:  GI     TUBAL/ECTOPIC PREGNANCY         Social History     Tobacco Use     Smoking status: Never Smoker     Smokeless tobacco: Never Used   Substance Use Topics     Alcohol use: No     Alcohol/week: 0.0 standard drinks     Family History   Problem Relation Age of Onset     Hypertension Mother      Diabetes Mother      Hypertension Sister      Family History Negative Father      Hypertension Other      Breast Cancer No family hx of      Cancer - colorectal No family hx of          Current Outpatient Medications   Medication Sig Dispense Refill     albuterol (PROAIR HFA/PROVENTIL HFA/VENTOLIN HFA) 108 (90 Base) MCG/ACT inhaler Inhale 2 puffs into the lungs every 4 hours as needed for shortness of  breath / dyspnea or wheezing 1 Inhaler 0     atorvastatin (LIPITOR) 20 MG tablet Take 1 tablet (20 mg) by mouth daily 90 tablet 3     azithromycin (ZITHROMAX) 250 MG tablet Two tablets first day, then one tablet daily for four days. 6 tablet 0     benzonatate (TESSALON) 200 MG capsule Take 1 capsule (200 mg) by mouth 3 times daily as needed for cough 30 capsule 1     Cholecalciferol (VITAMIN D) 2000 UNIT CAPS Take 1 tablet by mouth daily.       famotidine (PEPCID) 20 MG tablet Take 1 tablet (20 mg) by mouth 2 times daily 60 tablet 0     hydrochlorothiazide (HYDRODIURIL) 12.5 MG tablet TAKE 1 TABLET(12.5 MG) BY MOUTH DAILY 90 tablet 1     metoprolol succinate ER (TOPROL-XL) 100 MG 24 hr tablet Take 1 tablet (100 mg) by mouth daily 90 tablet 3     predniSONE (DELTASONE) 20 MG tablet Take 1 tablet (20 mg) by mouth daily 5 tablet 0       Reviewed and updated as needed this visit by Provider         Review of Systems   ROS COMP: Constitutional, HEENT, cardiovascular, pulmonary, gi and gu systems are negative, except as otherwise noted.      Objective    /78   Pulse 73   Temp 98.8  F (37.1  C) (Tympanic)   Ht 1.524 m (5')   Wt 74.8 kg (165 lb)   SpO2 98%   Breastfeeding No   BMI 32.22 kg/m    Body mass index is 32.22 kg/m .  Physical Exam       GENERAL APPEARANCE: healthy, alert and no distress  EYES: Eyes grossly normal to inspection, PERRL and conjunctivae and sclerae normal  HENT: ear canals and TM's normal and nose and mouth without ulcers or lesions  NECK: no adenopathy  RESP: lungs clear to auscultation - no rales, rhonchi or wheezes  CV: regular rates and rhythm, normal S1 S2, no S3          Assessment & Plan     Violet was seen today for cough.    Diagnoses and all orders for this visit:    Cough  -     famotidine (PEPCID) 20 MG tablet; Take 1 tablet (20 mg) by mouth 2 times daily  -     benzonatate (TESSALON) 200 MG capsule; Take 1 capsule (200 mg) by mouth 3 times daily as needed for cough  -      XR Chest 2 Views; Future  It is post viral cough  And she is having bronchitis with bronchospasm  We discussed the management  Put her on Tessalon Perles as needed  Albuterol inhaler as needed  Short course of prednisone  Due to coughing spells I also gave her Z-Darnell even though chances of having whooping cough unlikely but this cough is going on for so long  We will treat her with antibiotics  Will do the chest x-ray also  If no improvement then further investigations.    Bronchitis with bronchospasm  -     azithromycin (ZITHROMAX) 250 MG tablet; Two tablets first day, then one tablet daily for four days.  -     predniSONE (DELTASONE) 20 MG tablet; Take 1 tablet (20 mg) by mouth daily  -     albuterol (PROAIR HFA/PROVENTIL HFA/VENTOLIN HFA) 108 (90 Base) MCG/ACT inhaler; Inhale 2 puffs into the lungs every 4 hours as needed for shortness of breath / dyspnea or wheezing  See the note above    Reactive depression  Patient does not want any counseling or medication  It is related to her knee joint as due to her knee pain she is unable to do her job  She had surgery by orthopedics and has been following them regarding that  Knee is healing slowly  She thinks that makes her feel depressed as she is going to lose her job as she has been off for several weeks.       BMI:   Estimated body mass index is 32.22 kg/m  as calculated from the following:    Height as of this encounter: 1.524 m (5').    Weight as of this encounter: 74.8 kg (165 lb).       Disclaimer: This note consists of symbols derived from keyboarding, dictation and/or voice recognition software. As a result, there may be errors in the script that have gone undetected. Please consider this when interpreting information found in this chart.      Patient Instructions   Drink plenty of fluids.  Take extra rest.  Use albuterol inhaler 2 puffs every 4 hours as needed for cough  Take tessalon pearls as needed for cough  Take antibiotics as prescribed  Take prednisone  with food  Chest XR today  Follow up in one week  Seek sooner medical attention if there is any worsening of symptoms or problems.          No follow-ups on file.    Loli Thao MD  [facility]

## 2019-12-11 ENCOUNTER — TELEPHONE (OUTPATIENT)
Dept: FAMILY MEDICINE | Facility: CLINIC | Age: 59
End: 2019-12-11

## 2019-12-11 NOTE — TELEPHONE ENCOUNTER
Left message for patient to return call to clinic regarding message from Dr. Thao.    Anette Briggs MA      Notify patient that CXR shows possible pneumonia   Take antibiotics as prescribed .   Keep follow up appointment as scheduled.   Will repeat CXR in two months.

## 2019-12-11 NOTE — RESULT ENCOUNTER NOTE
Notify patient that CXR shows possible pneumonia  Take antibiotics as prescribed .  Keep follow up appointment as scheduled.  Will repeat CXR in two months.

## 2019-12-17 ENCOUNTER — OFFICE VISIT (OUTPATIENT)
Dept: FAMILY MEDICINE | Facility: CLINIC | Age: 59
End: 2019-12-17
Payer: COMMERCIAL

## 2019-12-17 VITALS
HEART RATE: 73 BPM | DIASTOLIC BLOOD PRESSURE: 78 MMHG | TEMPERATURE: 98.1 F | BODY MASS INDEX: 32.38 KG/M2 | OXYGEN SATURATION: 98 % | SYSTOLIC BLOOD PRESSURE: 138 MMHG | HEIGHT: 60 IN | WEIGHT: 164.9 LBS

## 2019-12-17 DIAGNOSIS — R93.89 ABNORMAL CHEST X-RAY: ICD-10-CM

## 2019-12-17 DIAGNOSIS — J18.9 PNEUMONIA OF LEFT LOWER LOBE DUE TO INFECTIOUS ORGANISM: Primary | ICD-10-CM

## 2019-12-17 DIAGNOSIS — J98.01 BRONCHOSPASM: ICD-10-CM

## 2019-12-17 PROCEDURE — 99213 OFFICE O/P EST LOW 20 MIN: CPT | Performed by: INTERNAL MEDICINE

## 2019-12-17 ASSESSMENT — MIFFLIN-ST. JEOR: SCORE: 1239.48

## 2019-12-17 NOTE — PATIENT INSTRUCTIONS
Follow up in 2 months  Chest XR will be done on day of your appointment   Seek sooner medical attention if there is any worsening of symptoms or problems.

## 2020-01-28 DIAGNOSIS — I10 ESSENTIAL HYPERTENSION: ICD-10-CM

## 2020-01-28 NOTE — TELEPHONE ENCOUNTER
"metoprolol succinate ER (TOPROL-XL) 100 MG 24 hr tablet    Last Written Prescription Date:  12/31/2018  Last Fill Quantity: 90,  # refills: 3   Last office visit: 12/17/2019 with prescribing provider:  Dr. Thao    Future Office Visit:   Next 5 appointments (look out 90 days)    Feb 20, 2020  4:00 PM CST  [facility] [address] [facility] [address] [phone]         Requested Prescriptions   Pending Prescriptions Disp Refills     metoprolol succinate ER (TOPROL-XL) 100 MG 24 hr tablet [Pharmacy Med Name: METOPROLOL ER SUCCINATE 100MG TABS] 90 tablet 3     Sig: TAKE 1 TABLET BY MOUTH DAILY       Beta-Blockers Protocol Passed - 1/28/2020  4:18 PM        Passed - Blood pressure under 140/90 in past 12 months     BP Readings from Last 3 Encounters:   12/17/19 138/78   12/10/19 139/78   06/24/19 139/84                 Passed - Patient is age 6 or older        Passed - Recent (12 mo) or future (30 days) visit within the authorizing provider's specialty     Patient has had an office visit with the authorizing provider or a provider within the authorizing providers department within the previous 12 mos or has a future within next 30 days. See \"Patient Info\" tab in inbasket, or \"Choose Columns\" in Meds & Orders section of the refill encounter.              Passed - Medication is active on med list          "

## 2020-01-29 RX ORDER — METOPROLOL SUCCINATE 100 MG/1
TABLET, EXTENDED RELEASE ORAL
Qty: 90 TABLET | Refills: 1 | Status: SHIPPED | OUTPATIENT
Start: 2020-01-29 | End: 2020-02-25

## 2020-02-25 ENCOUNTER — OFFICE VISIT (OUTPATIENT)
Dept: FAMILY MEDICINE | Facility: CLINIC | Age: 60
End: 2020-02-25
Payer: COMMERCIAL

## 2020-02-25 ENCOUNTER — ANCILLARY PROCEDURE (OUTPATIENT)
Dept: GENERAL RADIOLOGY | Facility: CLINIC | Age: 60
End: 2020-02-25
Attending: INTERNAL MEDICINE
Payer: COMMERCIAL

## 2020-02-25 VITALS
DIASTOLIC BLOOD PRESSURE: 70 MMHG | WEIGHT: 166 LBS | HEART RATE: 72 BPM | SYSTOLIC BLOOD PRESSURE: 130 MMHG | BODY MASS INDEX: 32.59 KG/M2 | HEIGHT: 60 IN | TEMPERATURE: 99.2 F | OXYGEN SATURATION: 99 %

## 2020-02-25 DIAGNOSIS — R05.9 COUGH: ICD-10-CM

## 2020-02-25 DIAGNOSIS — R93.89 ABNORMAL CHEST X-RAY: ICD-10-CM

## 2020-02-25 DIAGNOSIS — E78.5 HYPERLIPIDEMIA, UNSPECIFIED HYPERLIPIDEMIA TYPE: ICD-10-CM

## 2020-02-25 DIAGNOSIS — Z23 NEED FOR PROPHYLACTIC VACCINATION AND INOCULATION AGAINST INFLUENZA: ICD-10-CM

## 2020-02-25 DIAGNOSIS — I10 ESSENTIAL HYPERTENSION: Primary | ICD-10-CM

## 2020-02-25 DIAGNOSIS — Z13.29 SCREENING FOR THYROID DISORDER: ICD-10-CM

## 2020-02-25 PROCEDURE — 90686 IIV4 VACC NO PRSV 0.5 ML IM: CPT | Performed by: INTERNAL MEDICINE

## 2020-02-25 PROCEDURE — 71046 X-RAY EXAM CHEST 2 VIEWS: CPT

## 2020-02-25 PROCEDURE — 99214 OFFICE O/P EST MOD 30 MIN: CPT | Mod: 25 | Performed by: INTERNAL MEDICINE

## 2020-02-25 PROCEDURE — 90471 IMMUNIZATION ADMIN: CPT | Performed by: INTERNAL MEDICINE

## 2020-02-25 RX ORDER — FAMOTIDINE 20 MG/1
20 TABLET, FILM COATED ORAL 2 TIMES DAILY PRN
Qty: 180 TABLET | Refills: 1 | Status: SHIPPED | OUTPATIENT
Start: 2020-02-25 | End: 2021-02-17

## 2020-02-25 RX ORDER — BENZONATATE 200 MG/1
200 CAPSULE ORAL 2 TIMES DAILY PRN
Qty: 60 CAPSULE | Refills: 1 | Status: SHIPPED | OUTPATIENT
Start: 2020-02-25 | End: 2021-09-03

## 2020-02-25 RX ORDER — HYDROCHLOROTHIAZIDE 12.5 MG/1
12.5 TABLET ORAL DAILY
Qty: 90 TABLET | Refills: 1 | Status: SHIPPED | OUTPATIENT
Start: 2020-02-25 | End: 2020-09-28

## 2020-02-25 RX ORDER — ATORVASTATIN CALCIUM 20 MG/1
20 TABLET, FILM COATED ORAL DAILY
Qty: 90 TABLET | Refills: 3 | Status: SHIPPED | OUTPATIENT
Start: 2020-02-25 | End: 2021-05-28

## 2020-02-25 RX ORDER — METOPROLOL SUCCINATE 100 MG/1
100 TABLET, EXTENDED RELEASE ORAL DAILY
Qty: 90 TABLET | Refills: 1 | Status: SHIPPED | OUTPATIENT
Start: 2020-02-25 | End: 2020-08-20

## 2020-02-25 ASSESSMENT — MIFFLIN-ST. JEOR: SCORE: 1244.47

## 2020-02-25 NOTE — PROGRESS NOTES
Subjective     Violet Nunez is a 59 year old female who presents to clinic today for the following health issues:    HPI     2 month follow up - Pneumonia  Coughing has lingered, but greatly improved from before  Requests medication to keep cough under control  Pt is requesting to get her flu shot today    Hypertension  Her daughter checks her BP at home regularly  It is usually in the 120s and 130s systolic  Pt reports that her BP is always elevated when she comes here         Patient Active Problem List   Diagnosis     Vitamin D deficiency     Tendonitis of wrist, left     Essential hypertension     CARDIOVASCULAR SCREENING; LDL GOAL LESS THAN 130     Obesity (BMI 30-39.9)     Snoring     Cervical high risk HPV (human papillomavirus) test positive     Mixed hyperlipidemia     Reactive depression     Past Surgical History:   Procedure Laterality Date     COLONOSCOPY  9/9/2013    Procedure: COLONOSCOPY;  COLONOSCOPY ;  Surgeon: Marcelino Crook MD;  Location:  GI     TUBAL/ECTOPIC PREGNANCY         Social History     Tobacco Use     Smoking status: Never Smoker     Smokeless tobacco: Never Used   Substance Use Topics     Alcohol use: No     Alcohol/week: 0.0 standard drinks     Family History   Problem Relation Age of Onset     Hypertension Mother      Diabetes Mother      Hypertension Sister      Family History Negative Father      Hypertension Other      Breast Cancer No family hx of      Cancer - colorectal No family hx of            Current Outpatient Medications   Medication Sig Dispense Refill     atorvastatin (LIPITOR) 20 MG tablet Take 1 tablet (20 mg) by mouth daily 90 tablet 3     Cholecalciferol (VITAMIN D) 2000 UNIT CAPS Take 1 tablet by mouth daily.       famotidine (PEPCID) 20 MG tablet Take 1 tablet (20 mg) by mouth 2 times daily as needed 180 tablet 1     hydrochlorothiazide (HYDRODIURIL) 12.5 MG tablet TAKE 1 TABLET(12.5 MG) BY MOUTH DAILY 90 tablet 1     metoprolol succinate ER  (TOPROL-XL) 100 MG 24 hr tablet TAKE 1 TABLET BY MOUTH DAILY 90 tablet 1     Allergies   Allergen Reactions     No Known Allergies        Medications and Labs reviewed in EPIC    Reviewed and updated as needed this visit by Provider         Review of Systems   ROS COMP: Constitutional, HEENT, cardiovascular, pulmonary, GI, , musculoskeletal, neuro, skin, endocrine and psych systems are negative, except as otherwise noted.    POSITIVE for slight cough    This document serves as a record of the services and decisions personally performed and made by Loli Thao MD. It was created on her behalf by Antonieta Nava, a trained medical scribe. The creation of this document is based on the provider's statements to the medical scribe.  Antonieta Nava 2:27 PM February 25, 2020        Objective    BP (!) 149/72 (BP Location: Right arm, Cuff Size: Adult Large)   Pulse 72   Temp 99.2  F (37.3  C) (Tympanic)   Ht 1.524 m (5')   Wt 75.3 kg (166 lb)   SpO2 99%   Breastfeeding No   BMI 32.42 kg/m    Body mass index is 32.42 kg/m .  Physical Exam   GENERAL APPEARANCE: obese, alert and no distress  EYES: Eyes grossly normal to inspection, PERRL and conjunctivae and sclerae normal  HENT: ear canals and TM's normal and nose and mouth without ulcers or lesions  NECK: no adenopathy  RESP: lungs clear to auscultation - no rales, rhonchi or wheezes  CV: regular rates and rhythm, normal S1 S2, no S3  PSYCH: mentation appears normal, affect normal/bright      Diagnostic Test Results:  Labs reviewed in Epic  none         Assessment & Plan    Violet was seen today for follow up and imm/inj.    Diagnoses and all orders for this visit:    Essential hypertension  -     metoprolol succinate ER (TOPROL-XL) 100 MG 24 hr tablet; Take 1 tablet (100 mg) by mouth daily for Blood Pressure  -     hydrochlorothiazide (HYDRODIURIL) 12.5 MG tablet; Take 1 tablet (12.5 mg) by mouth daily for Blood Pressure  -     Comprehensive metabolic panel;  Future   Stable   Compliant with medication   Her daughter checks her BP at home regularly   Numbers are usually in the 120's and 130's systolic per pt   Pt reports having elevated BP whenever she comes here   BP elevated today (149/72)   BP rechecked in office and was 130/70    Need for prophylactic vaccination and inoculation against influenza  -     INFLUENZA VACCINE IM > 6 MONTHS VALENT IIV4 [98057]  -     Vaccine Administration, Initial [16071]  Flu shot will be administered in office today    Mixed hyperlipidemia  -     atorvastatin (LIPITOR) 20 MG tablet; Take 1 tablet (20 mg) by mouth daily for cholestrol  Stable  Compliant with medication    Cough  -     famotidine (PEPCID) 20 MG tablet; Take 1 tablet (20 mg) by mouth 2 times daily as needed for acid reflux  -     benzonatate (TESSALON) 200 MG capsule; Take 1 capsule (200 mg) by mouth 2 times daily as needed for cough  She is here today for a 2 month follow up from pneumonia  She is feeling better, but still has a lingering cough  The cough has greatly improved, but is still occassionally there  Pt is requesting medication to keep this under control  Famotidine and benzonatate have been prescribed for cough  Informed Pt that she will need another chest XR to confirm that pneumonia is gone  Chest XR has been ordered  She will go get this done today  We will send her a letter with the results    Abnormal chest x-ray  -     XR Chest 2 Views; Future  See Above    Screening for thyroid disorder  -     TSH with free T4 reflex; Future    Hyperlipidemia, unspecified hyperlipidemia type  -     Lipid panel reflex to direct LDL Fasting; Future           BMI:   Estimated body mass index is 32.42 kg/m  as calculated from the following:    Height as of this encounter: 1.524 m (5').    Weight as of this encounter: 75.3 kg (166 lb).   Weight management plan: Discussed healthy diet and exercise guidelines        Patient Instructions   Monitor your blood pressure once a week   at home.  Bring those readings on your next visit.  Notify us if your blood pressure readings consistently stays greater than 140/90.  Chest XR today  Follow up in 6 months.  Seek sooner medical attention if there is any worsening of symptoms or problems.          The information in this document, created by the medical scribe for me, accurately reflects the services I personally performed and the decisions made by me. I have reviewed and approved this document for accuracy prior to leaving the patient care area.  February 25, 2020 2:35 PM    Loli Thao MD  [facility]

## 2020-02-25 NOTE — RESULT ENCOUNTER NOTE
Please notify patient by calling if available otherwise send a letter    Her chest x-ray result is normal now

## 2020-02-25 NOTE — PATIENT INSTRUCTIONS
Monitor your blood pressure once a week  at home.  Bring those readings on your next visit.  Notify us if your blood pressure readings consistently stays greater than 140/90.  Chest XR today  Follow up in 6 months.  Seek sooner medical attention if there is any worsening of symptoms or problems.

## 2020-03-30 ENCOUNTER — TELEPHONE (OUTPATIENT)
Dept: FAMILY MEDICINE | Facility: CLINIC | Age: 60
End: 2020-03-30

## 2020-03-30 NOTE — TELEPHONE ENCOUNTER
Routing to Dr Thao.   Please read message from pharmacy below and send new RX, if OK.     Pharmacy pended.     Thank you,  Genia ACOSTA RN,BSN

## 2020-03-30 NOTE — TELEPHONE ENCOUNTER
"Pharmacy comments:    \"Famotidine (PEPCID) 20 MG tablet backordered. Is there an alternative that can be ordered? Thank you.\"  "

## 2020-03-31 NOTE — TELEPHONE ENCOUNTER
Spoke with pharmacy- They have OTC that they can pull and fill RX. (they note that they can deliver for $5 or do free curbside )     Spoke with Pt: She states she does not need a refill at this time, has medication at home. Will call when she is needing a refill    Abigail OROZCO RN

## 2020-03-31 NOTE — TELEPHONE ENCOUNTER
Please check with our pharmacy.  If they have availability then ask the patient if she wants us to transfer to our pharmacy.  Dr.Nasima Master MD

## 2020-09-16 NOTE — TELEPHONE ENCOUNTER
[address] Procedure Note    David Singer  AGE: 57 y.o. GENDER: male    : 1958  TODAY'S DATE: 2020    Chief Complaint   Patient presents with    Wound Check     bilateral legs F/U        History of Present Illness     David Singer is a 57 y.o. male who presents today for wound evaluation. History of Wound: venous and lymphedema wound located on the both legs  Wound Pain:  mild  Severity:  2 / 10   Wound Type:  venous and lymphedema  Modifying Factors:  edema, venous stasis, lymphedema, decreased mobility and obesity  Associated Signs/Symptoms:  edema and drainage    Procedure Note:     Performed by: Rosalina Garza MD    Consent obtained: Yes    Time out taken: Yes    Pain Control: Anesthetic  Anesthetic: 4% Lidocaine Cream     Debridement: Excisional Debridement    Using curette the wound was sharply debrided    down through and including the removal of epidermis, dermis and subcutaneous tissue. Devitalized Tissue Debrided: fibrin, biofilm and slough    Pre Debridement Measurements:  Are located in the Wound Documentation Flow Sheet     Wound #: 1 and 2     Post  Debridement Measurements:  Wound 20 Leg Right;Medial #1 (Active)   Wound Image   20 0849   Wound Venous 20 1118   Dressing/Treatment ABD; Ace wrap;Dry dressing;Non adherent 20 0937   Wound Cleansed Rinsed/Irrigated with saline 20 1135   Wound Length (cm) 1 cm 20 1118   Wound Width (cm) 0.3 cm 20 1118   Wound Depth (cm) 0.1 cm 20 1118   Wound Surface Area (cm^2) 0.3 cm^2 20 1118   Change in Wound Size % (l*w) 99.58 20 1118   Wound Volume (cm^3) 0.03 cm^3 20 1118   Wound Healing % 100 20 1118   Post-Procedure Length (cm) 1 cm 20 1135   Post-Procedure Width (cm) 0.3 cm 20 1135   Post-Procedure Depth (cm) 0.1 cm 20 1135   Post-Procedure Surface Area (cm^2) 0.3 cm^2 20 1135   Post-Procedure Volume (cm^3) 0.03 cm^3 Patient would like to discuss the change in medication with a nurse.    Please call at:   [phone]   09/16/20 1135   Wound Assessment Bleeding 09/16/20 1135   Drainage Amount Moderate 09/16/20 1135   Drainage Description Yellow 09/16/20 1118   Odor None 09/16/20 1118   Alicia-wound Assessment Edema; Excoriated 09/16/20 1118   [place]%Wound Bed 100 09/16/20 1118   Red%Wound Bed 0 09/16/20 1118   Yellow%Wound Bed 0 09/16/20 1118   Black%Wound Bed 0 09/16/20 1118   Purple%Wound Bed 0 09/16/20 1118   Other%Wound Bed 0 09/16/20 1118   Number of days: 42       Wound 08/05/20 Leg Posterior; Left #2 (Active)   Wound Image   08/05/20 0853   Wound Venous 09/16/20 1118   Dressing/Treatment ABD; Ace wrap;Dry dressing;Non adherent 08/05/20 0937   Wound Cleansed Rinsed/Irrigated with saline 09/16/20 1135   Wound Length (cm) 0.3 cm 09/16/20 1118   Wound Width (cm) 0.7 cm 09/16/20 1118   Wound Depth (cm) 0.1 cm 09/16/20 1118   Wound Surface Area (cm^2) 0.21 cm^2 09/16/20 1118   Change in Wound Size % (l*w) 99.03 09/16/20 1118   Wound Volume (cm^3) 0.02 cm^3 09/16/20 1118   Wound Healing % 99 09/16/20 1118   Post-Procedure Length (cm) 0.3 cm 09/16/20 1135   Post-Procedure Width (cm) 0.7 cm 09/16/20 1135   Post-Procedure Depth (cm) 0.1 cm 09/16/20 1135   Post-Procedure Surface Area (cm^2) 0.21 cm^2 09/16/20 1135   Post-Procedure Volume (cm^3) 0.02 cm^3 09/16/20 1135   Wound Assessment Bleeding 09/16/20 1135   Drainage Amount Moderate 09/16/20 1135   Drainage Description Yellow 09/16/20 1118   Odor None 09/16/20 1118   Alicia-wound Assessment Dry;Edema; Excoriated 09/16/20 1118   [place]%Wound Bed 0 09/16/20 1118   Red%Wound Bed 0 09/16/20 1118   Yellow%Wound Bed 100 09/16/20 1118   Black%Wound Bed 0 09/16/20 1118   Purple%Wound Bed 0 09/16/20 1118   Other%Wound Bed 0 09/16/20 1118   Number of days: 42       Total Surface Area Debrided:  0.51 sq cm     Bleeding:  Minimal    Hemostasis Achieved:  by pressure    Procedural Pain:  1  / 10     Post Procedural Pain:  2 / 10     Response to treatment:  Well tolerated by patient.        Assessment: Wound looks improved. (improved, worse or stable)    Patient tolerated procedure well and was given proper instruction. The nature of the patient's condition was explained in depth. The patient was informed that their compliance to the treatment plan is paramount to successful healing and prevention of further ulceration and/or infection       Plan:     Treatment Plan: With each dressing change, rinse wounds with 0.9% Saline. (May use wound wash or soft contact solution. Both can be purchased at a local drug store). If unable to obtain saline, may use a gentle soap and water. Dressing care: Right lower leg, Left lower leg- Antibiotic ointment, dry dressing, compression stockings or a 6\" ACE (from toes to knee)- change every other day. Of your pain increases you need to go to the Emergency department    [address].  Dylon Hyde MD, FACS  9/16/2020  1:12 PM

## 2020-09-25 DIAGNOSIS — I10 ESSENTIAL HYPERTENSION: ICD-10-CM

## 2020-09-28 RX ORDER — HYDROCHLOROTHIAZIDE 12.5 MG/1
12.5 TABLET ORAL DAILY
Qty: 90 TABLET | Refills: 1 | Status: SHIPPED | OUTPATIENT
Start: 2020-09-28 | End: 2022-08-05

## 2020-09-28 NOTE — TELEPHONE ENCOUNTER
Routing refill request to provider for review/approval because:  Labs not current:  Cr, K, Na    Creatinine   Date Value Ref Range Status   06/24/2019 0.66 0.52 - 1.04 mg/dL Final     Sodium   Date Value Ref Range Status   06/24/2019 140 133 - 144 mmol/L Final     Potassium   Date Value Ref Range Status   06/24/2019 3.8 3.4 - 5.3 mmol/L Final       Please review and authorize if appropriate,     Thank you,   Abigail SCHWARTZ RN

## 2021-02-15 DIAGNOSIS — I10 ESSENTIAL HYPERTENSION: ICD-10-CM

## 2021-02-15 DIAGNOSIS — R05.9 COUGH: ICD-10-CM

## 2021-02-17 RX ORDER — METOPROLOL SUCCINATE 100 MG/1
TABLET, EXTENDED RELEASE ORAL
Qty: 90 TABLET | Refills: 0 | Status: SHIPPED | OUTPATIENT
Start: 2021-02-17 | End: 2021-05-26

## 2021-02-17 RX ORDER — FAMOTIDINE 20 MG/1
20 TABLET, FILM COATED ORAL 2 TIMES DAILY PRN
Qty: 180 TABLET | Refills: 0 | Status: SHIPPED | OUTPATIENT
Start: 2021-02-17 | End: 2021-09-03

## 2021-03-24 ENCOUNTER — IMMUNIZATION (OUTPATIENT)
Dept: NURSING | Facility: CLINIC | Age: 61
End: 2021-03-24
Payer: COMMERCIAL

## 2021-03-24 PROCEDURE — 91300 PR COVID VAC PFIZER DIL RECON 30 MCG/0.3 ML IM: CPT

## 2021-03-24 PROCEDURE — 0001A PR COVID VAC PFIZER DIL RECON 30 MCG/0.3 ML IM: CPT

## 2021-04-14 ENCOUNTER — OFFICE VISIT (OUTPATIENT)
Dept: NURSING | Facility: CLINIC | Age: 61
End: 2021-04-14
Attending: INTERNAL MEDICINE
Payer: COMMERCIAL

## 2021-04-14 PROCEDURE — 0002A PR COVID VAC PFIZER DIL RECON 30 MCG/0.3 ML IM: CPT

## 2021-04-14 PROCEDURE — 91300 PR COVID VAC PFIZER DIL RECON 30 MCG/0.3 ML IM: CPT

## 2021-05-24 ENCOUNTER — TELEPHONE (OUTPATIENT)
Dept: FAMILY MEDICINE | Facility: CLINIC | Age: 61
End: 2021-05-24

## 2021-05-24 DIAGNOSIS — I10 ESSENTIAL HYPERTENSION: ICD-10-CM

## 2021-05-24 RX ORDER — METOPROLOL SUCCINATE 100 MG/1
TABLET, EXTENDED RELEASE ORAL
Qty: 30 TABLET | Refills: 0 | Status: CANCELLED | OUTPATIENT
Start: 2021-05-24

## 2021-05-24 NOTE — TELEPHONE ENCOUNTER
LOV 2- [place]  No future OV scheduled    SIG/Pharm note given  Does not appear patient uses her MyChart    Jill Gaines, RT (R)

## 2021-05-26 ENCOUNTER — NURSE TRIAGE (OUTPATIENT)
Dept: NURSING | Facility: CLINIC | Age: 61
End: 2021-05-26

## 2021-05-26 DIAGNOSIS — I10 ESSENTIAL HYPERTENSION: ICD-10-CM

## 2021-05-26 RX ORDER — METOPROLOL SUCCINATE 100 MG/1
TABLET, EXTENDED RELEASE ORAL
Qty: 90 TABLET | Refills: 1 | Status: SHIPPED | OUTPATIENT
Start: 2021-05-26 | End: 2021-09-03

## 2021-05-26 NOTE — TELEPHONE ENCOUNTER
"Requested Prescriptions   Pending Prescriptions Disp Refills     metoprolol succinate ER (TOPROL-XL) 100 MG 24 hr tablet 90 tablet 0     Sig: TAKE 1 TABLET(100 MG) BY MOUTH DAILY FOR BLOOD PRESSURE. Due for appointment. Please schedule: [phone]       Beta-Blockers Protocol Failed - 5/26/2021 10:11 AM        Failed - Blood pressure under 140/90 in past 12 months     BP Readings from Last 3 Encounters:   02/25/20 130/70   12/17/19 138/78   12/10/19 139/78                 Failed - Recent (12 mo) or future (30 days) visit within the authorizing provider's specialty     Patient has had an office visit with the authorizing provider or a provider within the authorizing providers department within the previous 12 mos or has a future within next 30 days. See \"Patient Info\" tab in inbasket, or \"Choose Columns\" in Meds & Orders section of the refill encounter.              Passed - Patient is age 6 or older        Passed - Medication is active on med list           Routing refill request to provider for review/approval because:  Loreta given x1 and patient did not follow up, please advise  Patient needs to be seen because it has been more than 1 year since last office visit.      "

## 2021-05-26 NOTE — TELEPHONE ENCOUNTER
Disregard, error.  Metoprolol was filled   Tati Braswell, RN  [facility] RN Triage Team

## 2021-05-26 NOTE — TELEPHONE ENCOUNTER
Refill needed metoprolol, pharmacy hasn't heard back from MD. Patient is out of the medication.  The pharmacy already loaned her medications and won't loan her more. Call daughter, Mc,  today at:  [phone].  Thank you,  Freida Evangelista RN  [facility]    Reason for Disposition    Request for URGENT new prescription or refill of 'essential' medication (i.e., likelihood of harm to patient if not taken) and triager unable to fill per department policy    Additional Information    Negative: Drug overdose and triager unable to answer question    Negative: Caller requesting information unrelated to medicine    Negative: Caller requesting a prescription for Strep throat and has a positive culture result    Negative: Rash while taking a medication or within 3 days of stopping it    Negative: Immunization reaction suspected    Negative: Asthma and having symptoms of asthma (cough, wheezing, etc.)    Negative: Breastfeeding questions about mother's medicines and diet    Negative: MORE THAN A DOUBLE DOSE of a prescription or over-the-counter (OTC) drug    Negative: DOUBLE DOSE (an extra dose or lesser amount) of over-the-counter (OTC) drug and any symptoms (e.g., dizziness, nausea, pain, sleepiness)    Negative: DOUBLE DOSE (an extra dose or lesser amount) of prescription drug and any symptoms (e.g., dizziness, nausea, pain, sleepiness)    Negative: Took another person's prescription drug    Negative: DOUBLE DOSE (an extra dose or lesser amount) of prescription drug and NO symptoms (Exception: a double dose of antibiotics)    Negative: Diabetes drug error or overdose (e.g., took wrong type of insulin or took extra dose)    Negative: Caller has medication question about med not prescribed by PCP and triager unable to answer question (e.g., compatibility with other med, storage)    Protocols used: MEDICATION QUESTION CALL-A-OH

## 2021-09-03 ENCOUNTER — OFFICE VISIT (OUTPATIENT)
Dept: FAMILY MEDICINE | Facility: CLINIC | Age: 61
End: 2021-09-03
Payer: COMMERCIAL

## 2021-09-03 VITALS
WEIGHT: 163 LBS | HEIGHT: 60 IN | RESPIRATION RATE: 20 BRPM | HEART RATE: 67 BPM | TEMPERATURE: 98.9 F | OXYGEN SATURATION: 97 % | SYSTOLIC BLOOD PRESSURE: 130 MMHG | BODY MASS INDEX: 32 KG/M2 | DIASTOLIC BLOOD PRESSURE: 70 MMHG

## 2021-09-03 DIAGNOSIS — E78.5 HYPERLIPIDEMIA, UNSPECIFIED HYPERLIPIDEMIA TYPE: ICD-10-CM

## 2021-09-03 DIAGNOSIS — Z13.0 SCREENING FOR DEFICIENCY ANEMIA: ICD-10-CM

## 2021-09-03 DIAGNOSIS — Z00.00 ROUTINE GENERAL MEDICAL EXAMINATION AT A HEALTH CARE FACILITY: Primary | ICD-10-CM

## 2021-09-03 DIAGNOSIS — Z12.31 VISIT FOR SCREENING MAMMOGRAM: ICD-10-CM

## 2021-09-03 DIAGNOSIS — I10 ESSENTIAL HYPERTENSION: ICD-10-CM

## 2021-09-03 DIAGNOSIS — R05.9 COUGH: ICD-10-CM

## 2021-09-03 DIAGNOSIS — Z79.899 MEDICATION MANAGEMENT: ICD-10-CM

## 2021-09-03 DIAGNOSIS — Z12.4 SCREENING FOR MALIGNANT NEOPLASM OF CERVIX: ICD-10-CM

## 2021-09-03 DIAGNOSIS — Z13.29 SCREENING FOR THYROID DISORDER: ICD-10-CM

## 2021-09-03 DIAGNOSIS — Z11.59 SCREENING FOR VIRAL DISEASE: ICD-10-CM

## 2021-09-03 LAB
ERYTHROCYTE [DISTWIDTH] IN BLOOD BY AUTOMATED COUNT: 12.9 % (ref 10–15)
HCT VFR BLD AUTO: 37.8 % (ref 35–47)
HGB BLD-MCNC: 13 G/DL (ref 11.7–15.7)
MCH RBC QN AUTO: 29.9 PG (ref 26.5–33)
MCHC RBC AUTO-ENTMCNC: 34.4 G/DL (ref 31.5–36.5)
MCV RBC AUTO: 87 FL (ref 78–100)
PLATELET # BLD AUTO: 190 10E3/UL (ref 150–450)
RBC # BLD AUTO: 4.35 10E6/UL (ref 3.8–5.2)
WBC # BLD AUTO: 6 10E3/UL (ref 4–11)

## 2021-09-03 PROCEDURE — 84443 ASSAY THYROID STIM HORMONE: CPT | Performed by: INTERNAL MEDICINE

## 2021-09-03 PROCEDURE — 99396 PREV VISIT EST AGE 40-64: CPT | Performed by: INTERNAL MEDICINE

## 2021-09-03 PROCEDURE — 86706 HEP B SURFACE ANTIBODY: CPT | Performed by: INTERNAL MEDICINE

## 2021-09-03 PROCEDURE — 80061 LIPID PANEL: CPT | Performed by: INTERNAL MEDICINE

## 2021-09-03 PROCEDURE — G0145 SCR C/V CYTO,THINLAYER,RESCR: HCPCS | Performed by: INTERNAL MEDICINE

## 2021-09-03 PROCEDURE — 80053 COMPREHEN METABOLIC PANEL: CPT | Performed by: INTERNAL MEDICINE

## 2021-09-03 PROCEDURE — 99214 OFFICE O/P EST MOD 30 MIN: CPT | Mod: 25 | Performed by: INTERNAL MEDICINE

## 2021-09-03 PROCEDURE — 85027 COMPLETE CBC AUTOMATED: CPT | Performed by: INTERNAL MEDICINE

## 2021-09-03 PROCEDURE — 36415 COLL VENOUS BLD VENIPUNCTURE: CPT | Performed by: INTERNAL MEDICINE

## 2021-09-03 PROCEDURE — 87624 HPV HI-RISK TYP POOLED RSLT: CPT | Performed by: INTERNAL MEDICINE

## 2021-09-03 RX ORDER — AZITHROMYCIN 250 MG/1
TABLET, FILM COATED ORAL
Qty: 6 TABLET | Refills: 0 | Status: SHIPPED | OUTPATIENT
Start: 2021-09-03 | End: 2021-10-12

## 2021-09-03 RX ORDER — METOPROLOL SUCCINATE 100 MG/1
100 TABLET, EXTENDED RELEASE ORAL DAILY
Qty: 90 TABLET | Refills: 3 | Status: SHIPPED | OUTPATIENT
Start: 2021-09-03 | End: 2022-08-05

## 2021-09-03 RX ORDER — BENZONATATE 200 MG/1
200 CAPSULE ORAL 2 TIMES DAILY PRN
Qty: 60 CAPSULE | Refills: 1 | Status: SHIPPED | OUTPATIENT
Start: 2021-09-03 | End: 2022-08-01

## 2021-09-03 RX ORDER — ATORVASTATIN CALCIUM 20 MG/1
20 TABLET, FILM COATED ORAL DAILY
Qty: 90 TABLET | Refills: 3 | Status: SHIPPED | OUTPATIENT
Start: 2021-09-03 | End: 2022-08-05

## 2021-09-03 RX ORDER — FAMOTIDINE 20 MG/1
20 TABLET, FILM COATED ORAL 2 TIMES DAILY PRN
Qty: 60 TABLET | Refills: 3 | Status: SHIPPED | OUTPATIENT
Start: 2021-09-03 | End: 2022-09-07

## 2021-09-03 RX ORDER — ALBUTEROL SULFATE 90 UG/1
2 AEROSOL, METERED RESPIRATORY (INHALATION) EVERY 4 HOURS PRN
Qty: 18 G | Refills: 0 | Status: SHIPPED | OUTPATIENT
Start: 2021-09-03 | End: 2021-10-19

## 2021-09-03 ASSESSMENT — ENCOUNTER SYMPTOMS
HEARTBURN: 1
NAUSEA: 0
CONSTIPATION: 0
CHILLS: 0
FREQUENCY: 0
EYE PAIN: 0
NERVOUS/ANXIOUS: 0
HEMATOCHEZIA: 0
MYALGIAS: 0
HEADACHES: 0
ARTHRALGIAS: 0
FEVER: 0
WEAKNESS: 0
BREAST MASS: 0
SORE THROAT: 0
DIZZINESS: 0
COUGH: 0
SHORTNESS OF BREATH: 0
HEMATURIA: 0
DYSURIA: 0
PALPITATIONS: 0
PARESTHESIAS: 0
ABDOMINAL PAIN: 0
DIARRHEA: 0
JOINT SWELLING: 0

## 2021-09-03 ASSESSMENT — MIFFLIN-ST. JEOR: SCORE: 1225.86

## 2021-09-03 NOTE — PROGRESS NOTES
SUBJECTIVE:   CC: Violet Nunez is an 60 year old woman who presents for preventive health visit.       Patient has been advised of split billing requirements and indicates understanding: Yes  Healthy Habits:     Getting at least 3 servings of Calcium per day:  Yes    Bi-annual eye exam:  Yes    Dental care twice a year:  Yes    Sleep apnea or symptoms of sleep apnea:  None    Diet:  Regular (no restrictions)    Frequency of exercise:  2-3 days/week    Duration of exercise:  15-30 minutes    Taking medications regularly:  Yes    Medication side effects:  None    PHQ-2 Total Score: 0    Additional concerns today:  No    Patient reports she ran out of her medication  She did not know that we keep instructing to her to make the appointment  Patient says they have been monitoring her blood pressure at home  Not regularly but whenever they do the numbers are under good control  She is planning to travel and requesting antibiotic as a backup  She is going to [facility]  She is also requesting cough medication and something to help her breathing  When she goes there her breathing gets affected and she feels short of breath  This does not happen here        Today's PHQ-2 Score:   PHQ-2 ( 1999 [employer]) 9/3/2021   Q1: Little interest or pleasure in doing things 0   Q2: Feeling down, depressed or hopeless 0   PHQ-2 Score 0   Q1: Little interest or pleasure in doing things Not at all   Q2: Feeling down, depressed or hopeless Not at all   PHQ-2 Score 0       Abuse: Current or Past (Physical, Sexual or Emotional) - No  Do you feel safe in your environment? Yes    Have you ever done Advance Care Planning? (For example, a Health Directive, POLST, or a discussion with a medical provider or your loved ones about your wishes): No, advance care planning information given to patient to review.  Patient declined advance care planning discussion at this time.    Social History     Tobacco Use     Smoking status: Never Smoker      Smokeless tobacco: Never Used   Substance Use Topics     Alcohol use: No     Alcohol/week: 0.0 standard drinks     If you drink alcohol do you typically have >3 drinks per day or >7 drinks per week? No    Alcohol Use 9/3/2021   Prescreen: >3 drinks/day or >7 drinks/week? No   Prescreen: >3 drinks/day or >7 drinks/week? -       Reviewed orders with patient.  Reviewed health maintenance and updated orders accordingly - Yes  Lab work is in process    Breast Cancer Screening:    Breast CA Risk Assessment (FHS-7) 9/3/2021   Do you have a family history of breast, colon, or ovarian cancer? No / Unknown           Pertinent mammograms are reviewed under the imaging tab.    History of abnormal Pap smear: NO - age 30-65 PAP every 5 years with negative HPV co-testing recommended  PAP / HPV Latest Ref Rng & Units 12/22/2017 11/17/2016 7/26/2013   PAP (Historical) - NIL NIL NIL   HPV16 NEG:Negative Negative Negative -   HPV18 NEG:Negative Negative Negative -   HRHPV NEG:Negative Negative Positive(A) -     Reviewed and updated as needed this visit by clinical staff  Tobacco  Allergies  Meds  Problems  Med Hx  Surg Hx  Fam Hx          Reviewed and updated as needed this visit by Provider                    Review of Systems  CONSTITUTIONAL: NEGATIVE for fever, chills, change in weight  INTEGUMENTARY/SKIN: NEGATIVE for worrisome rashes, moles or lesions  EYES: NEGATIVE for vision changes or irritation  ENT: NEGATIVE for ear, mouth and throat problems  RESP: NEGATIVE for significant cough or SOB  BREAST: NEGATIVE for masses, tenderness or discharge  CV: NEGATIVE for chest pain, palpitations or peripheral edema  GI: NEGATIVE for nausea, abdominal pain, heartburn, or change in bowel habits  : NEGATIVE for unusual urinary or vaginal symptoms. No vaginal bleeding.  MUSCULOSKELETAL: NEGATIVE for significant arthralgias or myalgia  NEURO: NEGATIVE for weakness, dizziness or paresthesias  PSYCHIATRIC: NEGATIVE for changes in mood  or affect    wants antibiotic and cough medication for travel  OBJECTIVE:   /70   Pulse 67   Temp 98.9  F (37.2  C) (Tympanic)   Resp 20   Ht 1.524 m (5')   Wt 73.9 kg (163 lb)   SpO2 97%   BMI 31.83 kg/m    Physical Exam  GENERAL: healthy, alert and no distress  EYES: Eyes grossly normal to inspection, PERRL and conjunctivae and sclerae normal  HENT: ear canals and TM's normal, nose and mouth without ulcers or lesions  NECK: no adenopathy, no asymmetry, masses, or scars and thyroid normal to palpation  RESP: lungs clear to auscultation - no rales, rhonchi or wheezes  BREAST: normal without masses, tenderness or nipple discharge and no palpable axillary masses or adenopathy  CV: regular rate and rhythm, normal S1 S2, no S3 or S4, no murmur, click or rub, no peripheral edema and peripheral pulses strong  ABDOMEN: soft, nontender, no hepatosplenomegaly, no masses and bowel sounds normal   (female): normal female external genitalia, normal urethral meatus, vaginal mucosa pink, moist, well rugated, and normal cervix/adnexa/uterus without masses or discharge  MS: no gross musculoskeletal defects noted, no edema  SKIN: no suspicious lesions or rashes  NEURO: Normal strength and tone, mentation intact and speech normal  PSYCH: mentation appears normal, affect normal/bright        ASSESSMENT/PLAN:   Violet was seen today for physical.    Diagnoses and all orders for this visit:    Routine general medical examination at a health care facility  Preventive health counseling was also done.  Due for mammogram  Last one was in 2017  Colonoscopy was on September 9, 2013    Screening for malignant neoplasm of cervix  -     Pap Screen with HPV - recommended age 30 - 65 years  Pap test was performed    Screening for viral disease  -     Hepatitis B Surface Antibody; Future  If not immune then will arrange hepatitis B immunization as she is traveling    Visit for screening mammogram  -     MA SCREENING DIGITAL BILAT -  Future  (s+30); Future  Discussed the importance of taking care of this    Screening for thyroid disorder  -     TSH with free T4 reflex; Future    Screening for deficiency anemia  -     CBC with platelets; Future    Essential hypertension  -     metoprolol succinate ER (TOPROL-XL) 100 MG 24 hr tablet; Take 1 tablet (100 mg) by mouth daily for Blood Pressure    Medication management  -     Comprehensive metabolic panel; Future    Hyperlipidemia, unspecified hyperlipidemia type  -     atorvastatin (LIPITOR) 20 MG tablet; Take 1 tablet (20 mg) by mouth daily for cholestrol  -     Lipid panel reflex to direct LDL Fasting; Future    Cough  -     benzonatate (TESSALON) 200 MG capsule; Take 1 capsule (200 mg) by mouth 2 times daily as needed for cough  -     famotidine (PEPCID) 20 MG tablet; Take 1 tablet (20 mg) by mouth 2 times daily as needed for acid reflux.  -     albuterol (PROAIR HFA/PROVENTIL HFA/VENTOLIN HFA) 108 (90 Base) MCG/ACT inhaler; Inhale 2 puffs into the lungs every 4 hours as needed for shortness of breath / dyspnea or wheezing  -     azithromycin (ZITHROMAX) 250 MG tablet; Two tablets first day, then one tablet daily for four days.  Patient requested medication for her cough  She is going to be traveling to [facility]  Over there she feels tightness in the chest and cough  She would like a backup prescription of antibiotic  She will use inhaler as needed  She requested cough medication so that was provided    Other orders  -     REVIEW OF HEALTH MAINTENANCE PROTOCOL ORDERS    Disclaimer: This note consists of symbols derived from keyboarding, dictation and/or voice recognition software. As a result, there may be errors in the script that have gone undetected. Please consider this when interpreting information found in this chart.      Patient has been advised of split billing requirements and indicates understanding: Yes  COUNSELING:  Reviewed preventive health counseling, as reflected in patient  instructions       Regular exercise       Healthy diet/nutrition       Osteoporosis prevention/bone health    Estimated body mass index is 31.83 kg/m  as calculated from the following:    Height as of this encounter: 1.524 m (5').    Weight as of this encounter: 73.9 kg (163 lb).    Weight management plan: Discussed healthy diet and exercise guidelines    She reports that she has never smoked. She has never used smokeless tobacco.      Counseling Resources:  ATP IV Guidelines  Pooled Cohorts Equation Calculator  Breast Cancer Risk Calculator  BRCA-Related Cancer Risk Assessment: FHS-7 Tool  FRAX Risk Assessment  ICSI Preventive Guidelines  Dietary Guidelines for Americans, 2010  USDA's MyPlate  ASA Prophylaxis  Lung CA Screening    Loli Thao MD  [facility]

## 2021-09-03 NOTE — PATIENT INSTRUCTIONS
Labs today  You are due for mammogram.  Please call the following number to make appointment :  [phone]  It is located in [address]      There is this is a new shingles vaccine available called shingrex  It is a series of 2 shots 2-6 months apart.  Considered more than 90% effective.  Please go to any pharmacy to get the  vaccine    Monitor your blood pressure once a week  at home.  Bring those readings on your next visit.  Notify us if your blood pressure readings consistently stays greater than 140/90.    I have prescribed albuterol inhaler for your travel  Back up script of azithromycin for travel as you requested   Tessalon johnathon as needed     Make appointment at travel clinic     Follow up in 6 months  Seek sooner medical attention if there is any worsening of symptoms or problems.        Preventive Health Recommendations  Female Ages 50 - 64    Yearly exam: See your health care provider every year in order to  o Review health changes.   o Discuss preventive care.    o Review your medicines if your doctor has prescribed any.      Get a Pap test every three years (unless you have an abnormal result and your provider advises testing more often).    If you get Pap tests with HPV test, you only need to test every 5 years, unless you have an abnormal result.     You do not need a Pap test if your uterus was removed (hysterectomy) and you have not had cancer.    You should be tested each year for STDs (sexually transmitted diseases) if you're at risk.     Have a mammogram every 1 to 2 years.    Have a colonoscopy at age 50, or have a yearly FIT test (stool test). These exams screen for colon cancer.      Have a cholesterol test every 5 years, or more often if advised.    Have a diabetes test (fasting glucose) every three years. If you are at risk for diabetes, you should have this test more often.     If you are at risk for osteoporosis (brittle bone disease), think about having a bone density scan  (DEXA).    Shots: Get a flu shot each year. Get a tetanus shot every 10 years.    Nutrition:     Eat at least 5 servings of fruits and vegetables each day.    Eat whole-grain bread, whole-wheat pasta and brown rice instead of white grains and rice.    Get adequate Calcium and Vitamin D.     Lifestyle    Exercise at least 150 minutes a week (30 minutes a day, 5 days a week). This will help you control your weight and prevent disease.    Limit alcohol to one drink per day.    No smoking.     Wear sunscreen to prevent skin cancer.     See your dentist every six months for an exam and cleaning.    See your eye doctor every 1 to 2 years.

## 2021-09-03 NOTE — RESULT ENCOUNTER NOTE
Viktor Lazo,    This is to inform you regarding your test result.    CBC result which includes white count Hemoglobin and  Platelet Counts is normal.   Other test results are pending.          Sincerely,      Dr.Nasima Master MD,FACP

## 2021-09-04 LAB
ALBUMIN SERPL-MCNC: 3.9 G/DL (ref 3.4–5)
ALP SERPL-CCNC: 53 U/L (ref 40–150)
ALT SERPL W P-5'-P-CCNC: 28 U/L (ref 0–50)
ANION GAP SERPL CALCULATED.3IONS-SCNC: 4 MMOL/L (ref 3–14)
AST SERPL W P-5'-P-CCNC: 20 U/L (ref 0–45)
BILIRUB SERPL-MCNC: 0.6 MG/DL (ref 0.2–1.3)
BUN SERPL-MCNC: 18 MG/DL (ref 7–30)
CALCIUM SERPL-MCNC: 9.4 MG/DL (ref 8.5–10.1)
CHLORIDE BLD-SCNC: 107 MMOL/L (ref 94–109)
CHOLEST SERPL-MCNC: 232 MG/DL
CO2 SERPL-SCNC: 26 MMOL/L (ref 20–32)
CREAT SERPL-MCNC: 0.62 MG/DL (ref 0.52–1.04)
FASTING STATUS PATIENT QL REPORTED: YES
GFR SERPL CREATININE-BSD FRML MDRD: >90 ML/MIN/1.73M2
GLUCOSE BLD-MCNC: 89 MG/DL (ref 70–99)
HDLC SERPL-MCNC: 73 MG/DL
LDLC SERPL CALC-MCNC: 143 MG/DL
NONHDLC SERPL-MCNC: 159 MG/DL
POTASSIUM BLD-SCNC: 4.2 MMOL/L (ref 3.4–5.3)
PROT SERPL-MCNC: 7.4 G/DL (ref 6.8–8.8)
SODIUM SERPL-SCNC: 137 MMOL/L (ref 133–144)
TRIGL SERPL-MCNC: 80 MG/DL
TSH SERPL DL<=0.005 MIU/L-ACNC: 1.53 MU/L (ref 0.4–4)

## 2021-09-06 NOTE — RESULT ENCOUNTER NOTE
Please notify patient by sending following letter with copy of test results      Viktor Lazo,    This is to inform you regarding your test result.    TSH which is thyroid hormone is normal.  Your total cholesterol is elevated.  HDL which is called good cholesterol is normal.  Your LDL which is called bad cholesterol is elevated.  Eat low cholesterol low fat  diet and do regular physical activity.  Resume your cholesterol lowering medication   The testing of your kidney function, liver function and electrolytes was normal  Glucose which is your blood sugar is normal.          Sincerely,      Dr.Nasima Master MD,FACP

## 2021-09-07 LAB — HBV SURFACE AB SERPL IA-ACNC: 0.49 M[IU]/ML

## 2021-09-08 LAB
BKR LAB AP GYN ADEQUACY: NORMAL
BKR LAB AP GYN INTERPRETATION: NORMAL
BKR LAB AP HPV REFLEX: NORMAL
BKR LAB AP PREVIOUS ABNORMAL: NORMAL
PATH REPORT.COMMENTS IMP SPEC: NORMAL
PATH REPORT.RELEVANT HX SPEC: NORMAL

## 2021-09-09 LAB
HUMAN PAPILLOMA VIRUS 16 DNA: NEGATIVE
HUMAN PAPILLOMA VIRUS 18 DNA: NEGATIVE
HUMAN PAPILLOMA VIRUS FINAL DIAGNOSIS: NORMAL
HUMAN PAPILLOMA VIRUS OTHER HR: NEGATIVE

## 2021-10-04 ENCOUNTER — E-VISIT (OUTPATIENT)
Dept: FAMILY MEDICINE | Facility: CLINIC | Age: 61
End: 2021-10-04
Payer: COMMERCIAL

## 2021-10-04 DIAGNOSIS — R05.8 DRY COUGH: Primary | ICD-10-CM

## 2021-10-04 PROCEDURE — 99421 OL DIG E/M SVC 5-10 MIN: CPT | Performed by: INTERNAL MEDICINE

## 2021-10-04 RX ORDER — BENZONATATE 200 MG/1
200 CAPSULE ORAL 2 TIMES DAILY PRN
Qty: 30 CAPSULE | Refills: 0 | Status: SHIPPED | OUTPATIENT
Start: 2021-10-04 | End: 2022-08-01

## 2021-10-04 NOTE — TELEPHONE ENCOUNTER
Provider E-Visit time total (minutes): 8 minutes    Spoke to the patient  No one is sick at home  She has only dry cough no mucus or fever  It is not too bad  It is going on for a week  She had similar cough long time ago  At this point symptomatic treatment is recommended  She has no fever  No colored phlegm  Not feeling very sick  No fever  No exposure to anyone with Covid  No one at home who is sick  So it could be allergy related  I am prescribing Tessalon Perles  If no improvement I would consider giving course of antibiotics Z-Darnell  She will send me my chart message if does not notice improvement  Dr.Nasima Master MD

## 2021-10-11 ENCOUNTER — MYC MEDICAL ADVICE (OUTPATIENT)
Dept: FAMILY MEDICINE | Facility: CLINIC | Age: 61
End: 2021-10-11

## 2021-10-11 DIAGNOSIS — R05.8 DRY COUGH: Primary | ICD-10-CM

## 2021-10-12 RX ORDER — AZITHROMYCIN 250 MG/1
TABLET, FILM COATED ORAL
Qty: 6 TABLET | Refills: 0 | Status: SHIPPED | OUTPATIENT
Start: 2021-10-12 | End: 2021-10-17

## 2021-10-12 NOTE — TELEPHONE ENCOUNTER
Reviewed evisit recommendation by Dr Thao. Irene sent as next step. If still not improved or worsening, recommend follow-up visit

## 2021-10-19 DIAGNOSIS — R05.9 COUGH: ICD-10-CM

## 2021-10-19 RX ORDER — ALBUTEROL SULFATE 90 UG/1
AEROSOL, METERED RESPIRATORY (INHALATION)
Qty: 18 G | Refills: 0 | Status: SHIPPED | OUTPATIENT
Start: 2021-10-19 | End: 2022-09-07

## 2021-11-09 DIAGNOSIS — I10 ESSENTIAL HYPERTENSION: ICD-10-CM

## 2021-11-10 RX ORDER — METOPROLOL SUCCINATE 100 MG/1
TABLET, EXTENDED RELEASE ORAL
Qty: 90 TABLET | Refills: 3 | OUTPATIENT
Start: 2021-11-10

## 2021-11-10 NOTE — TELEPHONE ENCOUNTER
metoprolol succinate ER (TOPROL-XL) 100 MG 24 hr tablet 90 tablet 3 9/3/2021  No   Sig - Route: Take 1 tablet (100 mg) by mouth daily for Blood Pressure - Oral   Sent to pharmacy as: Metoprolol Succinate  MG Oral Tablet Extended Release 24 Hour (TOPROL-XL)   Class: E-Prescribe   Order: [id number]   E-Prescribing Status: Receipt confirmed by pharmacy (9/3/2021 11:54 AM CDT)       Printout Tracking    External Result Report     Medication Administration Instructions    for Blood Pressure     Pharmacy    [id number] - [facility] [address] AT [place] [address]     Rx refused. Duplicate/early request. Pharmacy notified.

## 2022-01-11 ENCOUNTER — E-VISIT (OUTPATIENT)
Dept: FAMILY MEDICINE | Facility: CLINIC | Age: 62
End: 2022-01-11
Payer: COMMERCIAL

## 2022-01-11 DIAGNOSIS — R05.9 COUGH: ICD-10-CM

## 2022-01-11 DIAGNOSIS — J20.9 ACUTE BRONCHITIS, UNSPECIFIED ORGANISM: Primary | ICD-10-CM

## 2022-01-11 PROCEDURE — 99421 OL DIG E/M SVC 5-10 MIN: CPT | Performed by: INTERNAL MEDICINE

## 2022-01-11 RX ORDER — AZITHROMYCIN 250 MG/1
TABLET, FILM COATED ORAL
Qty: 6 TABLET | Refills: 0 | Status: SHIPPED | OUTPATIENT
Start: 2022-01-11 | End: 2022-08-01

## 2022-01-11 RX ORDER — BENZONATATE 200 MG/1
200 CAPSULE ORAL 2 TIMES DAILY PRN
Qty: 20 CAPSULE | Refills: 0 | Status: SHIPPED | OUTPATIENT
Start: 2022-01-11 | End: 2022-08-01

## 2022-01-11 NOTE — PATIENT INSTRUCTIONS
"    Dear Violet Nunez    After reviewing your responses, I've been able to diagnose you with \"Bronchitis\" which is a common infection of your lungs. While this is most commonly caused by a virus, the symptoms you have given suggest you should be treated with antibiotics.     I have sent z-mario to your pharmacy to treat this infection.     It is important that you take all of your prescribed medication even if your symptoms are improving after a few doses. Taking all of your medicine helps prevent the symptoms from returning.     If your symptoms worsen, you develop chest pain or shortness of breath, fevers over 101, or are not improving in 5 days, please contact your primary care provider for an appointment or visit any of our convenient Walk-in Care or Urgent Care Centers to be seen which can be found on our website here.    Thanks again for choosing us as your health care partner,    Loli Thao MD    "

## 2022-01-11 NOTE — TELEPHONE ENCOUNTER
Provider E-Visit time total (minutes):  7 minutes    I tried to call her twice on her cell number and also I tried her other phone number listed  Could not get hold of her  It sounds like she has bronchitis  But due to pandemic I want to make sure she does not have any COVID that is why I wanted to give her a call  Dr.Nasima Master MD      I tried to call the patient but her daughter picked the phone and she mentioned that they did the home COVID test which was negative  They have no concerns about the COVID but if her symptoms does not improve they will do another test at home  But she agreed with the antibiotic and cough medication

## 2022-08-01 ENCOUNTER — E-VISIT (OUTPATIENT)
Dept: FAMILY MEDICINE | Facility: CLINIC | Age: 62
End: 2022-08-01
Payer: COMMERCIAL

## 2022-08-01 DIAGNOSIS — R05.9 COUGH: ICD-10-CM

## 2022-08-01 DIAGNOSIS — J20.9 ACUTE BRONCHITIS, UNSPECIFIED ORGANISM: ICD-10-CM

## 2022-08-01 PROCEDURE — 99421 OL DIG E/M SVC 5-10 MIN: CPT | Performed by: INTERNAL MEDICINE

## 2022-08-01 RX ORDER — AZITHROMYCIN 250 MG/1
TABLET, FILM COATED ORAL
Qty: 6 TABLET | Refills: 0 | Status: SHIPPED | OUTPATIENT
Start: 2022-08-01 | End: 2022-09-12

## 2022-08-01 RX ORDER — BENZONATATE 200 MG/1
200 CAPSULE ORAL 2 TIMES DAILY PRN
Qty: 30 CAPSULE | Refills: 3 | Status: SHIPPED | OUTPATIENT
Start: 2022-08-01 | End: 2022-09-13

## 2022-08-01 NOTE — TELEPHONE ENCOUNTER
Provider E-Visit time total (minutes): 7 minutes      I spoke to the patient  She tested herself for COVID and she is negative  Advised to drink lots of fluid  Take extra rest  Z-Darnell and Tessalon Perles as prescribed  Patient requested Tessalon Perles as it helps her a lot    Dr.Nasima Master MD

## 2022-08-01 NOTE — PATIENT INSTRUCTIONS
"    Dear Violet Nunez    After reviewing your responses, I've been able to diagnose you with \"Bronchitis\" which is a common infection of your lungs. While this is most commonly caused by a virus, the symptoms you have given suggest you should be treated with antibiotics.     I have sent  azithromycin to your pharmacy to treat this infection.     It is important that you take all of your prescribed medication even if your symptoms are improving after a few doses. Taking all of your medicine helps prevent the symptoms from returning.     If your symptoms worsen, you develop chest pain or shortness of breath, fevers over 101, or are not improving in 5 days, please contact your primary care provider for an appointment or visit any of our convenient Walk-in Care or Urgent Care Centers to be seen which can be found on our website here.    Thanks again for choosing us as your health care partner,    Loli Thao MD    "

## 2022-08-05 ENCOUNTER — TELEPHONE (OUTPATIENT)
Dept: FAMILY MEDICINE | Facility: CLINIC | Age: 62
End: 2022-08-05

## 2022-08-05 ENCOUNTER — OFFICE VISIT (OUTPATIENT)
Dept: FAMILY MEDICINE | Facility: CLINIC | Age: 62
End: 2022-08-05
Payer: COMMERCIAL

## 2022-08-05 VITALS
DIASTOLIC BLOOD PRESSURE: 85 MMHG | OXYGEN SATURATION: 95 % | HEIGHT: 60 IN | HEART RATE: 71 BPM | BODY MASS INDEX: 31.83 KG/M2 | RESPIRATION RATE: 16 BRPM | TEMPERATURE: 97.7 F | SYSTOLIC BLOOD PRESSURE: 159 MMHG

## 2022-08-05 DIAGNOSIS — I10 ESSENTIAL HYPERTENSION: Primary | ICD-10-CM

## 2022-08-05 DIAGNOSIS — Z13.0 SCREENING FOR DEFICIENCY ANEMIA: ICD-10-CM

## 2022-08-05 DIAGNOSIS — Z79.899 MEDICATION MANAGEMENT: ICD-10-CM

## 2022-08-05 DIAGNOSIS — E78.5 HYPERLIPIDEMIA, UNSPECIFIED HYPERLIPIDEMIA TYPE: ICD-10-CM

## 2022-08-05 DIAGNOSIS — Z13.29 SCREENING FOR THYROID DISORDER: ICD-10-CM

## 2022-08-05 LAB
ERYTHROCYTE [DISTWIDTH] IN BLOOD BY AUTOMATED COUNT: 12.2 % (ref 10–15)
HCT VFR BLD AUTO: 39.6 % (ref 35–47)
HGB BLD-MCNC: 13.1 G/DL (ref 11.7–15.7)
MCH RBC QN AUTO: 29.3 PG (ref 26.5–33)
MCHC RBC AUTO-ENTMCNC: 33.1 G/DL (ref 31.5–36.5)
MCV RBC AUTO: 89 FL (ref 78–100)
PLATELET # BLD AUTO: 212 10E3/UL (ref 150–450)
RBC # BLD AUTO: 4.47 10E6/UL (ref 3.8–5.2)
WBC # BLD AUTO: 8.1 10E3/UL (ref 4–11)

## 2022-08-05 PROCEDURE — 80061 LIPID PANEL: CPT | Performed by: INTERNAL MEDICINE

## 2022-08-05 PROCEDURE — 84443 ASSAY THYROID STIM HORMONE: CPT | Performed by: INTERNAL MEDICINE

## 2022-08-05 PROCEDURE — 85027 COMPLETE CBC AUTOMATED: CPT | Performed by: INTERNAL MEDICINE

## 2022-08-05 PROCEDURE — 99214 OFFICE O/P EST MOD 30 MIN: CPT | Performed by: INTERNAL MEDICINE

## 2022-08-05 PROCEDURE — 80053 COMPREHEN METABOLIC PANEL: CPT | Performed by: INTERNAL MEDICINE

## 2022-08-05 PROCEDURE — 36415 COLL VENOUS BLD VENIPUNCTURE: CPT | Performed by: INTERNAL MEDICINE

## 2022-08-05 RX ORDER — ATORVASTATIN CALCIUM 20 MG/1
20 TABLET, FILM COATED ORAL DAILY
Qty: 90 TABLET | Refills: 3 | Status: SHIPPED | OUTPATIENT
Start: 2022-08-05 | End: 2022-09-13

## 2022-08-05 RX ORDER — METOPROLOL SUCCINATE 100 MG/1
100 TABLET, EXTENDED RELEASE ORAL DAILY
Qty: 90 TABLET | Refills: 3 | Status: SHIPPED | OUTPATIENT
Start: 2022-08-05 | End: 2022-09-07

## 2022-08-05 RX ORDER — HYDROCHLOROTHIAZIDE 12.5 MG/1
12.5 TABLET ORAL DAILY
Qty: 90 TABLET | Refills: 1 | Status: SHIPPED | OUTPATIENT
Start: 2022-08-05 | End: 2023-02-23

## 2022-08-05 ASSESSMENT — PAIN SCALES - GENERAL: PAINLEVEL: NO PAIN (0)

## 2022-08-05 ASSESSMENT — PATIENT HEALTH QUESTIONNAIRE - PHQ9: SUM OF ALL RESPONSES TO PHQ QUESTIONS 1-9: 3

## 2022-08-05 NOTE — TELEPHONE ENCOUNTER
Reason for Call:  Appointment Request    Patient requesting this type of appt:  Preventive     Requested provider: Loli Thao    Reason patient unable to be scheduled: Not within requested timeframe    When does patient want to be seen/preferred time: Due for preventive sept nothing available until Jan    Comments: See TE for  Angie- they would like to schedule preventive for the same day    Could we send this information to you in [facility] or would you prefer to receive a phone call?:   Patient would prefer a phone call   Okay to leave a detailed message?: Yes at Home number on file [phone] (home)    Call taken on 8/5/2022 at 11:31 AM by Jocelynn Oglesby

## 2022-08-05 NOTE — PATIENT INSTRUCTIONS
There is a new shingles vaccine available called shingrex  It is a series of 2 shots 2-6 months apart.  Considered more than 90% effective.  Please go to any pharmacy to get the  vaccine    You are due for second Covid booster     You are due for mammogram.  Please call the following number to make appointment :  [phone]  It is located in [address]    Monitor your blood pressure once a week  at home.  Bring those readings on your next visit.  Notify us if your blood pressure readings consistently stays greater than 140/90.     Update me in 2-4 weeks about your Blood Pressure       Having high blood pressure puts you at risk for heart attack, stroke, kidney damage, and other serious problems.   High blood pressure doesn't usually cause symptoms, so people sometimes don't take it seriously  The medicines your doctor or nurse prescribes to treat high blood pressure can help reduce the risk of these problems and even help you live longer.    You have a lot of control over your blood pressure. To lower it:  ?Lose weight (if you are overweight)  ?Choose a diet low in fat and rich in fruits, vegetables, and low-fat dairy products  ?Reduce the amount of salt you eat  ?Do something active for at least 30 minutes a day on most days of the week  ?Cut down on alcohol (if you drink more than 2 alcoholic drinks per day)

## 2022-08-05 NOTE — PROGRESS NOTES
Assessment & Plan     Diagnoses and all orders for this visit:    Essential hypertension  -     metoprolol succinate ER (TOPROL XL) 100 MG 24 hr tablet; Take 1 tablet (100 mg) by mouth daily for Blood Pressure  -     hydrochlorothiazide (HYDRODIURIL) 12.5 MG tablet; Take 1 tablet (12.5 mg) by mouth daily for Blood Pressure  Blood pressure is on higher side  She has not been taking her hydrochlorothiazide  She when she checks it at home it is under good control  She does not check it very frequently  I advised the patient to keep monitoring that  Resume your hydrochlorothiazide  Take both the medication  Monitor your blood pressure once a week  at home.  Bring those readings on your next visit.  Notify us if your blood pressure readings consistently stays greater than 140/90.  We may have to add another medication if blood pressure stays on higher side    Hyperlipidemia, unspecified hyperlipidemia type  -     Lipid panel reflex to direct LDL Non-fasting; Future  -     atorvastatin (LIPITOR) 20 MG tablet; Take 1 tablet (20 mg) by mouth daily for cholestrol  -     Lipid panel reflex to direct LDL Non-fasting  Low-cholesterol low-fat diet    Screening for deficiency anemia  -     CBC with platelets; Future  -     CBC with platelets    Medication management  -     Comprehensive metabolic panel; Future  -     Comprehensive metabolic panel    Screening for thyroid disorder  -     TSH with free T4 reflex; Future  -     TSH with free T4 reflex      Preventive health counseling was also done.  Per patient she has upper respiratory tract infection and cough  Both are improving  Every time she gets cold the cough lasts longer  But Tessalon pulses have been helping  She is finishing the course of her antibiotic  Overall improving  Seek attention if problem persist         BMI:   Estimated body mass index is 31.83 kg/m  as calculated from the following:    Height as of this encounter: 1.524 m (5').    Weight as of 9/3/21: 73.9  kg (163 lb).       See Patient Instructions  Patient Instructions   There is a new shingles vaccine available called shingrex  It is a series of 2 shots 2-6 months apart.  Considered more than 90% effective.  Please go to any pharmacy to get the  vaccine    You are due for second Covid booster     You are due for mammogram.  Please call the following number to make appointment :  [phone]  It is located in [address]    Monitor your blood pressure once a week  at home.  Bring those readings on your next visit.  Notify us if your blood pressure readings consistently stays greater than 140/90.     Update me in 2-4 weeks about your Blood Pressure       Having high blood pressure puts you at risk for heart attack, stroke, kidney damage, and other serious problems.   High blood pressure doesn't usually cause symptoms, so people sometimes don't take it seriously  The medicines your doctor or nurse prescribes to treat high blood pressure can help reduce the risk of these problems and even help you live longer.    You have a lot of control over your blood pressure. To lower it:  ?Lose weight (if you are overweight)  ?Choose a diet low in fat and rich in fruits, vegetables, and low-fat dairy products  ?Reduce the amount of salt you eat  ?Do something active for at least 30 minutes a day on most days of the week  ?Cut down on alcohol (if you drink more than 2 alcoholic drinks per day)            Return in about 3 months (around 11/5/2022) for medication follow up, Hypertension.    Loli Thao MD  [facility] TIGRE Lazo is a 61 year old, presenting for the following health issues:  No chief complaint on file.      HPI     Follow up      Coughing after any cold  Got from grand children  Improving now         Review of Systems         Objective    BP (!) 159/85   Pulse 71   Temp 97.7  F (36.5  C) (Temporal)   Resp 16   Ht 1.524 m (5')   SpO2 95%   BMI 31.83 kg/m    Body mass index  is 31.83 kg/m .  Physical Exam       GENERAL APPEARANCE: healthy, alert and no distress  EYES: Eyes grossly normal to inspection, PERRL and conjunctivae and sclerae normal  HENT: ear canals and TM's normal and nose and mouth without ulcers or lesions  NECK: no adenopathy  RESP: lungs clear to auscultation - no rales, rhonchi or wheezes  CV: regular rates and rhythm, normal S1 S2, no S3      Disclaimer: This note consists of symbols derived from keyboarding, dictation and/or voice recognition software. As a result, there may be errors in the script that have gone undetected. Please consider this when interpreting information found in this chart.                .  ..

## 2022-08-07 LAB
ALBUMIN SERPL-MCNC: 4.6 G/DL (ref 3.4–5)
ALP SERPL-CCNC: 102 U/L (ref 40–150)
ALT SERPL W P-5'-P-CCNC: 46 U/L (ref 0–50)
ANION GAP SERPL CALCULATED.3IONS-SCNC: 4 MMOL/L (ref 3–14)
AST SERPL W P-5'-P-CCNC: 18 U/L (ref 0–45)
BILIRUB SERPL-MCNC: 0.6 MG/DL (ref 0.2–1.3)
BUN SERPL-MCNC: 18 MG/DL (ref 7–30)
CALCIUM SERPL-MCNC: 9 MG/DL (ref 8.5–10.1)
CHLORIDE BLD-SCNC: 102 MMOL/L (ref 94–109)
CHOLEST SERPL-MCNC: 159 MG/DL
CO2 SERPL-SCNC: 29 MMOL/L (ref 20–32)
CREAT SERPL-MCNC: 0.63 MG/DL (ref 0.52–1.04)
FASTING STATUS PATIENT QL REPORTED: YES
GFR SERPL CREATININE-BSD FRML MDRD: >90 ML/MIN/1.73M2
GLUCOSE BLD-MCNC: 109 MG/DL (ref 70–99)
HDLC SERPL-MCNC: 67 MG/DL
LDLC SERPL CALC-MCNC: 71 MG/DL
NONHDLC SERPL-MCNC: 92 MG/DL
POTASSIUM BLD-SCNC: 4.8 MMOL/L (ref 3.4–5.3)
PROT SERPL-MCNC: 8 G/DL (ref 6.8–8.8)
SODIUM SERPL-SCNC: 135 MMOL/L (ref 133–144)
TRIGL SERPL-MCNC: 106 MG/DL
TSH SERPL DL<=0.005 MIU/L-ACNC: 1.16 MU/L (ref 0.4–4)

## 2022-08-08 NOTE — RESULT ENCOUNTER NOTE
Viktor Lazo,    This is to inform you regarding your test result.    Your total cholesterol is normal.  HDL which is called good cholesterol is normal.  Your LDL cholesterol is normal.  This is often call bad cholesterol and high levels increase the risk for heart attacks and strokes.  Your triglycerides are normal.  TSH which is thyroid hormone is normal.  The testing of your kidney function, liver function and electrolytes was satisfactory   Glucose which is your blood sugar is slightly elevated.  CBC result which includes white count Hemoglobin and  Platelet Counts is normal.         Sincerely,      Dr.Nasima Master MD,FACP

## 2022-09-07 ENCOUNTER — MYC MEDICAL ADVICE (OUTPATIENT)
Dept: FAMILY MEDICINE | Facility: CLINIC | Age: 62
End: 2022-09-07

## 2022-09-07 ENCOUNTER — MYC REFILL (OUTPATIENT)
Dept: FAMILY MEDICINE | Facility: CLINIC | Age: 62
End: 2022-09-07

## 2022-09-07 DIAGNOSIS — R05.9 COUGH: ICD-10-CM

## 2022-09-07 DIAGNOSIS — J20.9 ACUTE BRONCHITIS, UNSPECIFIED ORGANISM: ICD-10-CM

## 2022-09-07 DIAGNOSIS — E78.5 HYPERLIPIDEMIA, UNSPECIFIED HYPERLIPIDEMIA TYPE: ICD-10-CM

## 2022-09-07 NOTE — PROGRESS NOTES
Ashvin Nunez is a 59 year old female who presents to clinic today for the following health issues:    HPI     Follow up on chest x-ray and possible pneumonia  She was seen in the clinic on December 10 due to cough and had chest x-ray  She was given course of prednisone as well as antibiotic  She was also given Tessalon Perles to be used on as-needed basis.    Patient Active Problem List   Diagnosis     Vitamin D deficiency     Tendonitis of wrist, left     Essential hypertension     CARDIOVASCULAR SCREENING; LDL GOAL LESS THAN 130     Obesity (BMI 30-39.9)     Snoring     Cervical high risk HPV (human papillomavirus) test positive     Mixed hyperlipidemia     Reactive depression     Past Surgical History:   Procedure Laterality Date     COLONOSCOPY  9/9/2013    Procedure: COLONOSCOPY;  COLONOSCOPY ;  Surgeon: Marcelino Crook MD;  Location:  GI     TUBAL/ECTOPIC PREGNANCY         Social History     Tobacco Use     Smoking status: Never Smoker     Smokeless tobacco: Never Used   Substance Use Topics     Alcohol use: No     Alcohol/week: 0.0 standard drinks     Family History   Problem Relation Age of Onset     Hypertension Mother      Diabetes Mother      Hypertension Sister      Family History Negative Father      Hypertension Other      Breast Cancer No family hx of      Cancer - colorectal No family hx of          Current Outpatient Medications   Medication Sig Dispense Refill     atorvastatin (LIPITOR) 20 MG tablet Take 1 tablet (20 mg) by mouth daily 90 tablet 3     benzonatate (TESSALON) 200 MG capsule Take 1 capsule (200 mg) by mouth 3 times daily as needed for cough 30 capsule 1     Cholecalciferol (VITAMIN D) 2000 UNIT CAPS Take 1 tablet by mouth daily.       famotidine (PEPCID) 20 MG tablet Take 1 tablet (20 mg) by mouth 2 times daily 60 tablet 0     hydrochlorothiazide (HYDRODIURIL) 12.5 MG tablet TAKE 1 TABLET(12.5 MG) BY MOUTH DAILY 90 tablet 1     metoprolol succinate ER  (TOPROL-XL) 100 MG 24 hr tablet Take 1 tablet (100 mg) by mouth daily 90 tablet 3       Her symptoms have improved remarkably  Cough is almost gone  She did not use inhaler  Took the antibiotic and prednisone  Taking Tessalon Perles as needed  Overall feels so much better    Reviewed and updated as needed this visit by Provider         Review of Systems   ROS COMP: Constitutional, HEENT, cardiovascular, pulmonary, gi and gu systems are negative, except as otherwise noted.      Objective    There were no vitals taken for this visit.  There is no height or weight on file to calculate BMI.  Physical Exam       GENERAL APPEARANCE: healthy, alert and no distress  EYES: Eyes grossly normal to inspection, PERRL and conjunctivae and sclerae normal  HENT: ear canals and TM's normal and nose and mouth without ulcers or lesions  NECK: no adenopathy  RESP: lungs clear to auscultation - no rales, rhonchi or wheezes  CV: regular rates and rhythm, normal S1 S2, no S3                    Assessment & Plan     Parbatie was seen today for recheck.    Diagnoses and all orders for this visit:    Pneumonia of left lower lobe due to infectious organism (H)  -     XR Chest 2 Views; Future  Pneumonia symptoms have resolved  Still has some cough but much better than before  Tessalon Perles are helping  Discussed the chest x-ray results with the patient  Will repeat chest x-ray in 2 months  She will make follow-up appointment at that time    Bronchospasm  Has improved with prednisone    Abnormal chest x-ray  -     XR Chest 2 Views; Future    Chest x-ray dated December 10 showed possible pneumonia  Will repeat in 2 months       BMI:   Estimated body mass index is 32.2 kg/m  as calculated from the following:    Height as of this encounter: 1.524 m (5').    Weight as of this encounter: 74.8 kg (164 lb 14.4 oz).           Patient Instructions   Follow up in 2 months  Chest XR will be done on day of your appointment   Seek sooner medical attention  if there is any worsening of symptoms or problems.        Return in about 2 months (around 2/17/2020).    Loli Thao MD  [facility]         oral

## 2022-09-12 RX ORDER — AZITHROMYCIN 250 MG/1
TABLET, FILM COATED ORAL
Qty: 6 TABLET | Refills: 0 | Status: CANCELLED | OUTPATIENT
Start: 2022-09-12

## 2022-09-12 RX ORDER — ALBUTEROL SULFATE 90 UG/1
AEROSOL, METERED RESPIRATORY (INHALATION)
Qty: 18 G | Refills: 0 | OUTPATIENT
Start: 2022-09-12

## 2022-09-12 NOTE — TELEPHONE ENCOUNTER
"Writer sent patient [employer] message to clarify if they are needing refill of azithromycin (ZITHROMAX) 250 MG tablet [Loli Thao].    Dx for acute illness: Acute bronchitis, unspecified organism [J20.9]     Writer discontinued medication from med list as \"therapy completed\".    Destiny Lyle RN  [facility]    "

## 2022-09-13 ENCOUNTER — NURSE TRIAGE (OUTPATIENT)
Dept: FAMILY MEDICINE | Facility: CLINIC | Age: 62
End: 2022-09-13

## 2022-09-13 DIAGNOSIS — R05.9 COUGH: ICD-10-CM

## 2022-09-13 DIAGNOSIS — J40 BRONCHITIS: Primary | ICD-10-CM

## 2022-09-13 RX ORDER — AZITHROMYCIN 250 MG/1
TABLET, FILM COATED ORAL
Qty: 6 TABLET | Refills: 0 | Status: SHIPPED | OUTPATIENT
Start: 2022-09-13 | End: 2023-11-16

## 2022-09-13 RX ORDER — BENZONATATE 200 MG/1
200 CAPSULE ORAL 2 TIMES DAILY PRN
Qty: 30 CAPSULE | Refills: 3 | Status: SHIPPED | OUTPATIENT
Start: 2022-09-13 | End: 2023-11-16

## 2022-09-13 RX ORDER — ATORVASTATIN CALCIUM 20 MG/1
20 TABLET, FILM COATED ORAL DAILY
Qty: 90 TABLET | Refills: 3 | Status: SHIPPED | OUTPATIENT
Start: 2022-08-05 | End: 2023-05-26

## 2022-09-13 NOTE — TELEPHONE ENCOUNTER
I renewed both the prescription as patient is going out of country  Let the patient know  Prescription was sent to the [facility] in [place]  Dr.Nasima Master MD

## 2022-09-13 NOTE — TELEPHONE ENCOUNTER
Writer called patient to follow up on refill request for azithromycin (see encounter 9/7/22)  Patient seen in OV on 8/5/22, patient prescribed previously prescribed zpack and tessalon pearls for cough. Patient requesting refill as ptiant is going out of the country to [facility] next week. Per maya PCP advised patient to call back for refills.     Cough triage;   Onset: 2 days  Cough  Denies shortness of breath, fever,chest pain, coughing up blood, wheezing  Yellow sputum  Heart hx: denies  Lung hx: denies  PE risk factors: none  Patient reports they tested Negative for covid    Triaged per Epic Triage Protocol, gave care advice, home care, which patient plans to follow.  See Care advice tab for more information.pcp requesting message to be sent to PCP, Per maya PCP advised patient to call back for refills of tessalon and zpack. Writer reiterated home care recommendations and advised OTC medications but patient still requeting reiflls of tessalon and zpack incase symptoms get worse during trip. pcp please advise.     Preferred pharmacy: [id number] [address] AT [place] [address]    Callback: [phone]- ok to leave detailed VM.    Destiny Lyle RN  [facility]      Reason for Disposition    Cough with no complications    Additional Information    Negative: Bluish (or gray) lips or face    Negative: SEVERE difficulty breathing (e.g., struggling for each breath, speaks in single words)    Negative: Rapid onset of cough and has hives    Negative: Coughing started suddenly after medicine, an allergic food or bee sting    Negative: Difficulty breathing after exposure to flames, smoke, or fumes    Negative: Sounds like a life-threatening emergency to the triager    Negative: Previous asthma attacks and this feels like asthma attack    Negative: Dry cough (non-productive; no sputum or minimal clear sputum) and within 14 days of COVID-19  Exposure    Negative: MODERATE difficulty breathing (e.g., speaks in phrases, SOB even at rest, pulse 100-120) and still present when not coughing    Negative: Chest pain present when not coughing    Negative: Passed out (i.e., fainted, collapsed and was not responding)    Negative: Patient sounds very sick or weak to the triager    Negative: MILD difficulty breathing (e.g., minimal/no SOB at rest, SOB with walking, pulse <100) and still present when not coughing    Negative: Coughed up > 1 tablespoon (15 ml) blood (Exception: Blood-tinged sputum.)    Negative: Fever > 103 F (39.4 C)    Negative: Fever > 101 F (38.3 C) and over 60 years of age    Negative: Fever > 100.0 F (37.8 C) and has diabetes mellitus or a weak immune system (e.g., HIV positive, cancer chemotherapy, organ transplant, splenectomy, chronic steroids)    Negative: Fever > 100.0 F (37.8 C) and bedridden (e.g., nursing home patient, stroke, chronic illness, recovering from surgery)    Negative: Increasing ankle swelling    Negative: Wheezing is present    Negative: SEVERE coughing spells (e.g., whooping sound after coughing, vomiting after coughing)    Negative: Coughing up nilesh-colored (reddish-brown) or blood-tinged sputum    Negative: Fever present > 3 days (72 hours)    Negative: Fever returns after gone for over 24 hours and symptoms worse or not improved    Negative: Using nasal washes and pain medicine > 24 hours and sinus pain persists    Negative: Known COPD or other severe lung disease (i.e., bronchiectasis, cystic fibrosis, lung surgery) and worsening symptoms (i.e., increased sputum purulence or amount, increased breathing difficulty)    Negative: Continuous (nonstop) coughing interferes with work or school and no improvement using cough treatment per Care Advice    Negative: Patient wants to be seen    Negative: Cough has been present for > 3 weeks    Negative: Allergy symptoms are also present (e.g., itchy eyes, clear nasal discharge,  postnasal drip)    Negative: Nasal discharge present > 10 days    Negative: Exposure to TB (Tuberculosis)    Negative: Taking an ACE Inhibitor medication (e.g., benazepril/LOTENSIN, captopril/CAPOTEN, enalapril/VASOTEC, lisinopril/ZESTRIL)    Protocols used: COUGH-A-OH

## 2022-09-13 NOTE — TELEPHONE ENCOUNTER
Patient has not reviewed [employer] message    Patient Contact     Attempt #: 1     Was call answered? Yes,   Writer verified if patient was needing refill of zithromax (see triage encounter 9/13/22).     Writer reviewed that script for inhaler sent to pharmacy.   Patient requesting refill of atorvastatin, writer reviewed that script sent 8/5/22 for year supply to pharmacy. Per patient pharmacy does not have script. Writer re-orded with original start date (8/5/22)    Destiny Lyle RN  [facility]

## 2023-07-25 DIAGNOSIS — R05.9 COUGH: ICD-10-CM

## 2023-07-25 RX ORDER — FAMOTIDINE 20 MG/1
TABLET, FILM COATED ORAL
Qty: 60 TABLET | Refills: 0 | Status: SHIPPED | OUTPATIENT
Start: 2023-07-25 | End: 2023-08-29

## 2023-08-29 DIAGNOSIS — R05.9 COUGH: ICD-10-CM

## 2023-08-29 DIAGNOSIS — I10 ESSENTIAL HYPERTENSION: ICD-10-CM

## 2023-08-29 RX ORDER — FAMOTIDINE 20 MG/1
TABLET, FILM COATED ORAL
Qty: 60 TABLET | Refills: 0 | Status: SHIPPED | OUTPATIENT
Start: 2023-08-29 | End: 2023-10-09

## 2023-08-29 RX ORDER — METOPROLOL SUCCINATE 100 MG/1
TABLET, EXTENDED RELEASE ORAL
Qty: 90 TABLET | Refills: 0 | Status: SHIPPED | OUTPATIENT
Start: 2023-08-29 | End: 2023-11-16

## 2023-10-07 DIAGNOSIS — R05.9 COUGH: ICD-10-CM

## 2023-10-09 RX ORDER — FAMOTIDINE 20 MG/1
TABLET, FILM COATED ORAL
Qty: 60 TABLET | Refills: 0 | Status: SHIPPED | OUTPATIENT
Start: 2023-10-09 | End: 2024-08-09

## 2023-10-09 NOTE — TELEPHONE ENCOUNTER
She is overdue for the appointment .  Okay to use my reserve slot for the appointment    Dr.Nasima Master MD

## 2023-10-25 NOTE — TELEPHONE ENCOUNTER
Daughter (C2C) calling to schedule appt.     Future Appointments 10/25/2023 - 4/22/2024        Date Visit Type Length Department Provider     11/16/2023 11:30 AM OFFICE VISIT 30 min CS [facility]ILY [facility]/Loli Klein MD    Location Instructions:     [facility] is in [address] of the [facility] at [address] This is just south of [facility] and the [facility] exit off of [address]. Free parking is available; access the lot by turning east from [facility] onto [address]. Through the main entrance, the clinic is directly to the left.

## 2023-11-16 ENCOUNTER — OFFICE VISIT (OUTPATIENT)
Dept: FAMILY MEDICINE | Facility: CLINIC | Age: 63
End: 2023-11-16
Payer: COMMERCIAL

## 2023-11-16 VITALS
SYSTOLIC BLOOD PRESSURE: 160 MMHG | HEART RATE: 65 BPM | HEIGHT: 60 IN | TEMPERATURE: 98.8 F | BODY MASS INDEX: 32.39 KG/M2 | WEIGHT: 165 LBS | DIASTOLIC BLOOD PRESSURE: 80 MMHG | OXYGEN SATURATION: 99 % | RESPIRATION RATE: 16 BRPM

## 2023-11-16 DIAGNOSIS — E78.5 HYPERLIPIDEMIA, UNSPECIFIED HYPERLIPIDEMIA TYPE: ICD-10-CM

## 2023-11-16 DIAGNOSIS — Z23 NEED FOR PROPHYLACTIC VACCINATION AND INOCULATION AGAINST INFLUENZA: ICD-10-CM

## 2023-11-16 DIAGNOSIS — Z00.00 ROUTINE GENERAL MEDICAL EXAMINATION AT A HEALTH CARE FACILITY: Primary | ICD-10-CM

## 2023-11-16 DIAGNOSIS — Z12.11 SCREEN FOR COLON CANCER: ICD-10-CM

## 2023-11-16 DIAGNOSIS — Z13.29 SCREENING FOR THYROID DISORDER: ICD-10-CM

## 2023-11-16 DIAGNOSIS — Z79.899 MEDICATION MANAGEMENT: ICD-10-CM

## 2023-11-16 DIAGNOSIS — I10 ESSENTIAL HYPERTENSION: ICD-10-CM

## 2023-11-16 DIAGNOSIS — Z12.31 VISIT FOR SCREENING MAMMOGRAM: ICD-10-CM

## 2023-11-16 DIAGNOSIS — Z13.0 SCREENING FOR DEFICIENCY ANEMIA: ICD-10-CM

## 2023-11-16 LAB
ALBUMIN SERPL BCG-MCNC: 4.5 G/DL (ref 3.5–5.2)
ALP SERPL-CCNC: 69 U/L (ref 40–150)
ALT SERPL W P-5'-P-CCNC: 25 U/L (ref 0–50)
ANION GAP SERPL CALCULATED.3IONS-SCNC: 12 MMOL/L (ref 7–15)
AST SERPL W P-5'-P-CCNC: 33 U/L (ref 0–45)
BILIRUB SERPL-MCNC: 0.5 MG/DL
BUN SERPL-MCNC: 14.8 MG/DL (ref 8–23)
CALCIUM SERPL-MCNC: 9.6 MG/DL (ref 8.8–10.2)
CHLORIDE SERPL-SCNC: 102 MMOL/L (ref 98–107)
CHOLEST SERPL-MCNC: 193 MG/DL
CREAT SERPL-MCNC: 0.58 MG/DL (ref 0.51–0.95)
DEPRECATED HCO3 PLAS-SCNC: 26 MMOL/L (ref 22–29)
EGFRCR SERPLBLD CKD-EPI 2021: >90 ML/MIN/1.73M2
ERYTHROCYTE [DISTWIDTH] IN BLOOD BY AUTOMATED COUNT: 12.4 % (ref 10–15)
GLUCOSE SERPL-MCNC: 96 MG/DL (ref 70–99)
HCT VFR BLD AUTO: 41.3 % (ref 35–47)
HDLC SERPL-MCNC: 71 MG/DL
HGB BLD-MCNC: 13.2 G/DL (ref 11.7–15.7)
LDLC SERPL CALC-MCNC: 103 MG/DL
MCH RBC QN AUTO: 28.3 PG (ref 26.5–33)
MCHC RBC AUTO-ENTMCNC: 32 G/DL (ref 31.5–36.5)
MCV RBC AUTO: 88 FL (ref 78–100)
NONHDLC SERPL-MCNC: 122 MG/DL
PLATELET # BLD AUTO: 211 10E3/UL (ref 150–450)
POTASSIUM SERPL-SCNC: 4.6 MMOL/L (ref 3.4–5.3)
PROT SERPL-MCNC: 7.6 G/DL (ref 6.4–8.3)
RBC # BLD AUTO: 4.67 10E6/UL (ref 3.8–5.2)
SODIUM SERPL-SCNC: 140 MMOL/L (ref 135–145)
TRIGL SERPL-MCNC: 95 MG/DL
TSH SERPL DL<=0.005 MIU/L-ACNC: 1.7 UIU/ML (ref 0.3–4.2)
WBC # BLD AUTO: 8.1 10E3/UL (ref 4–11)

## 2023-11-16 PROCEDURE — 99214 OFFICE O/P EST MOD 30 MIN: CPT | Mod: 25 | Performed by: INTERNAL MEDICINE

## 2023-11-16 PROCEDURE — 84443 ASSAY THYROID STIM HORMONE: CPT | Performed by: INTERNAL MEDICINE

## 2023-11-16 PROCEDURE — 85027 COMPLETE CBC AUTOMATED: CPT | Performed by: INTERNAL MEDICINE

## 2023-11-16 PROCEDURE — 80053 COMPREHEN METABOLIC PANEL: CPT | Performed by: INTERNAL MEDICINE

## 2023-11-16 PROCEDURE — 99396 PREV VISIT EST AGE 40-64: CPT | Mod: 25 | Performed by: INTERNAL MEDICINE

## 2023-11-16 PROCEDURE — 36415 COLL VENOUS BLD VENIPUNCTURE: CPT | Performed by: INTERNAL MEDICINE

## 2023-11-16 PROCEDURE — 90471 IMMUNIZATION ADMIN: CPT | Performed by: INTERNAL MEDICINE

## 2023-11-16 PROCEDURE — 80061 LIPID PANEL: CPT | Performed by: INTERNAL MEDICINE

## 2023-11-16 PROCEDURE — 90686 IIV4 VACC NO PRSV 0.5 ML IM: CPT | Performed by: INTERNAL MEDICINE

## 2023-11-16 RX ORDER — METOPROLOL SUCCINATE 100 MG/1
100 TABLET, EXTENDED RELEASE ORAL DAILY
Qty: 90 TABLET | Refills: 3 | Status: SHIPPED | OUTPATIENT
Start: 2023-11-16

## 2023-11-16 RX ORDER — ATORVASTATIN CALCIUM 20 MG/1
20 TABLET, FILM COATED ORAL DAILY
Qty: 90 TABLET | Refills: 3 | Status: SHIPPED | OUTPATIENT
Start: 2023-11-16

## 2023-11-16 RX ORDER — AMLODIPINE BESYLATE 5 MG/1
5 TABLET ORAL DAILY
Qty: 90 TABLET | Refills: 1 | Status: SHIPPED | OUTPATIENT
Start: 2023-11-16

## 2023-11-16 ASSESSMENT — PAIN SCALES - GENERAL: PAINLEVEL: NO PAIN (0)

## 2023-11-16 ASSESSMENT — PATIENT HEALTH QUESTIONNAIRE - PHQ9: SUM OF ALL RESPONSES TO PHQ QUESTIONS 1-9: 1

## 2023-11-16 NOTE — PATIENT INSTRUCTIONS
Monitor your blood pressure once a day at home.  Bring those readings on your next visit.  Notify us if your blood pressure readings consistently stays greater than 140/90.     Continue metoprolol  Continue your Lipitor   I added amlodipine for Blood Pressure     Respiratory syncytial virus (RSV) is an important cause of lower respiratory tract disease in older adults.   In 2023, the [place] Food and Drug Administration approved two recombinant RSV vaccines for the prevention of lower respiratory tract disease in individuals 60 years of age and older [1,2].     this vaccine prevented RSV-related respiratory infection and lower respiratory tract disease in adults age 60 years and older who received one dose of an AS01E-adjuvanted RSV Prefusion F Protein Vaccine [51].     This infection may cause serious infection like pneumonia in older patients     So it is good idea to get this RSV vaccine from any local pharmacy .      There is a new shingles vaccine available called shingrex  It is a series of 2 shots 2-6 months apart.  Considered more than 90% effective.  Please go to any pharmacy to get the  vaccine    You are due for covid booster     You are due for mammogram.  Please call the following number to make appointment :  [phone]  It is located in [address]    Schedule colonoscopy at your earliest convenience by calling the following number:  [facility]  :[phone].      Follow up in one week    Seek sooner medical attention if there is any worsening of symptoms or problems.      Preventive Health Recommendations  Female Ages 50 - 64    Yearly exam: See your health care provider every year in order to  Review health changes.   Discuss preventive care.    Review your medicines if your doctor has prescribed any.    Get a Pap test every three years (unless you have an abnormal result and your provider advises testing more often).  If you get Pap tests with HPV test, you only need to test every 5 years,  unless you have an abnormal result.   You do not need a Pap test if your uterus was removed (hysterectomy) and you have not had cancer.  You should be tested each year for STDs (sexually transmitted diseases) if you're at risk.   Have a mammogram every 1 to 2 years.  Have a colonoscopy at age 45, or have a yearly FIT test (stool test). These exams screen for colon cancer.    Have a cholesterol test every 5 years, or more often if advised.  Have a diabetes test (fasting glucose) every three years. If you are at risk for diabetes, you should have this test more often.   If you are at risk for osteoporosis (brittle bone disease), think about having a bone density scan (DEXA).    Shots: Get a flu shot each year. Get a tetanus shot every 10 years.    Nutrition:   Eat at least 5 servings of fruits and vegetables each day.  Eat whole-grain bread, whole-wheat pasta and brown rice instead of white grains and rice.  Get adequate Calcium and Vitamin D.     Lifestyle  Exercise at least 150 minutes a week (30 minutes a day, 5 days a week). This will help you control your weight and prevent disease.  Limit alcohol to one drink per day.  No smoking.   Wear sunscreen to prevent skin cancer.   See your dentist every six months for an exam and cleaning.  See your eye doctor every 1 to 2 years.

## 2023-11-16 NOTE — PROGRESS NOTES
SUBJECTIVE:   Violet is a 63 year old, presenting for the following:  Physical and Imm/Inj (Flu Shot)    Healthy Habits:     Getting at least 3 servings of Calcium per day:  Yes    Bi-annual eye exam:  NO    Dental care twice a year:  NO    Sleep apnea or symptoms of sleep apnea:  Excessive snoring    Diet:  Regular (no restrictions)    Frequency of exercise:  None    Duration of exercise:  N/A    Taking medications regularly:  Yes    Barriers to taking medications:  None    Medication side effects:  None    Additional concerns today:  Yes    Social History     Tobacco Use    Smoking status: Never    Smokeless tobacco: Never   Substance Use Topics    Alcohol use: No     Alcohol/week: 0.0 standard drinks of alcohol             9/3/2021    11:30 AM   Alcohol Use   Prescreen: >3 drinks/day or >7 drinks/week? No     Reviewed orders with patient.  Reviewed health maintenance and updated orders accordingly - Yes  Labs reviewed in EPIC    Breast Cancer Screenin/3/2021    11:31 AM   Breast CA Risk Assessment (FHS-7)   Do you have a family history of breast, colon, or ovarian cancer? No / Unknown     Mammogram Screening: Recommended mammography every 1-2 years with patient discussion and risk factor consideration  Pertinent mammograms are reviewed under the imaging tab.    History of abnormal Pap smear: NO - age 30- 65 PAP every 3 years recommended      Latest Ref Rng & Units 9/3/2021    11:51 AM 2017     2:15 PM 2017     1:26 PM   PAP / HPV   PAP  Negative for Intraepithelial Lesion or Malignancy (NILM)      PAP (Historical)    NIL    HPV 16 DNA Negative Negative  Negative     HPV 18 DNA Negative Negative  Negative     Other HR HPV Negative Negative  Negative       Reviewed and updated as needed this visit by clinical staff   Tobacco  Allergies  Meds              Reviewed and updated as needed this visit by Provider                 Past Medical History:   Diagnosis Date    Cervical high risk  "HPV (human papillomavirus) test positive 11/17/16    (not 16 or 18)    Hypertension       Past Surgical History:   Procedure Laterality Date    COLONOSCOPY  9/9/2013    Procedure: COLONOSCOPY;  COLONOSCOPY ;  Surgeon: Marcelino Crook MD;  Location:  GI    TUBAL/ECTOPIC PREGNANCY       OB History   No obstetric history on file.       Review of Systems  CONSTITUTIONAL: NEGATIVE for fever, chills, change in weight  INTEGUMENTARU/SKIN: NEGATIVE for worrisome rashes, moles or lesions  EYES: NEGATIVE for vision changes or irritation  ENT: NEGATIVE for ear, mouth and throat problems  RESP: NEGATIVE for significant cough or SOB  BREAST: NEGATIVE for masses, tenderness or discharge  CV: NEGATIVE for chest pain, palpitations or peripheral edema  GI: NEGATIVE for nausea, abdominal pain, heartburn, or change in bowel habits  : NEGATIVE for unusual urinary or vaginal symptoms. Periods are regular.  MUSCULOSKELETAL: NEGATIVE for significant arthralgias or myalgia  NEURO: NEGATIVE for weakness, dizziness or paresthesias  PSYCHIATRIC: NEGATIVE for changes in mood or affect     OBJECTIVE:   BP (!) 160/80 (BP Location: Right arm, Patient Position: Sitting)   Pulse 65   Temp 98.8  F (37.1  C) (Oral)   Resp 16   Ht 1.52 m (4' 11.84\")   Wt 74.8 kg (165 lb)   SpO2 99%   BMI 32.39 kg/m    Physical Exam  GENERAL: healthy, alert and no distress  EYES: Eyes grossly normal to inspection, PERRL and conjunctivae and sclerae normal  HENT: ear canals and TM's normal, nose and mouth without ulcers or lesions  NECK: no adenopathy, no asymmetry, masses, or scars and thyroid normal to palpation  RESP: lungs clear to auscultation - no rales, rhonchi or wheezes  BREAST: normal without masses, tenderness or nipple discharge and no palpable axillary masses or adenopathy  CV: regular rate and rhythm, normal S1 S2, no S3 or S4, no murmur, click or rub, no peripheral edema and peripheral pulses strong  ABDOMEN: soft, nontender, no " hepatosplenomegaly, no masses and bowel sounds normal  MS: no gross musculoskeletal defects noted, no edema  SKIN: no suspicious lesions or rashes  NEURO: Normal strength and tone, mentation intact and speech normal  PSYCH: mentation appears normal, affect normal/bright    Diagnostic Test Results:  Labs reviewed in Epic    ASSESSMENT/PLAN:   Vioelt was seen today for physical.    Diagnoses and all orders for this visit:    Routine general medical examination at a health care facility  Preventive health counseling was also done.  Colonoscopy due, order placed   Papsmear completed in 2021    Influenza immunization administered today   Pt is due for COVID booster, shingles, and RSV she will schedule at her earliest convenience  Declined for today  Pt is due for mammogram, she will schedule at her earliest convenience.     Visit for screening mammogram  Mammogram screening ordered  -     MA SCREENING DIGITAL BILAT - Future  (s+30); Future    Screen for colon cancer  Colonoscopy screening ordered   -     Colonoscopy Screening  Referral; Future    Hyperlipidemia, unspecified hyperlipidemia type  Pt is on Lipitor 20 mg   Lipid panel ordered, future   -     atorvastatin (LIPITOR) 20 MG tablet; Take 1 tablet (20 mg) by mouth daily  -     Lipid panel reflex to direct LDL Fasting; Future    Essential hypertension  BP upon arrival 193/81, rechecked at 160/80   Pt is on metoprolol 100 mg   Pt is not taking hydrochlorothiazide, results in dizziness - discontinued   Pt was not taking lipitor 20 mg, re-prescribed today   I prescribed amlodipine 5 mg daily ( to be started )  Follow up in one week to check BP again   Monitor BP at home regularly and keep track of levels   I reminded her of the risk factor of having high BP.  -     metoprolol succinate ER (TOPROL XL) 100 MG 24 hr tablet; Take 1 tablet (100 mg) by mouth daily  -     amLODIPine (NORVASC) 5 MG tablet; Take 1 tablet (5 mg) by mouth daily  Having high blood  pressure puts you at risk for heart attack, stroke, kidney damage, and other serious problems.   High blood pressure doesn't usually cause symptoms, so people sometimes don't take it seriously  The medicines your doctor or nurse prescribes to treat high blood pressure can help reduce the risk of these problems and even help you live longer.    You have a lot of control over your blood pressure. To lower it:  ?Lose weight (if you are overweight)  ?Choose a diet low in fat and rich in fruits, vegetables, and low-fat dairy products  ?Reduce the amount of salt you eat  ?Do something active for at least 30 minutes a day on most days of the week  ?Cut down on alcohol (if you drink more than 2 alcoholic drinks per day)      Screening for thyroid disorder  -     TSH with free T4 reflex; Future    Medication management  -     Comprehensive metabolic panel; Future    Screening for deficiency anemia  -     CBC with platelets; Future    Other orders  -     REVIEW OF HEALTH MAINTENANCE PROTOCOL ORDERS  -     PRIMARY CARE FOLLOW-UP SCHEDULING; Future    Patient has been advised of split billing requirements and indicates understanding: Yes    COUNSELING:  Reviewed preventive health counseling, as reflected in patient instructions       Regular exercise       Healthy diet/nutrition    She reports that she has never smoked. She has never used smokeless tobacco.        Loli Thao MD  [facility]    This document serves as a record of sevices personally performed by Dr. Thao. It was created on her behalf by Lazara Mann, a trained medical scribe. The creation of this record is based on the scribe's personal observations and the provider's statements to them. This document has been checked and approved by the attending provider.     11/16/2023, 11:59 AM

## 2023-11-20 NOTE — RESULT ENCOUNTER NOTE
Viktor Lazo    This is to inform you regarding your test result.    Your total cholesterol is normal.  HDL which is called good cholesterol is normal.  Your LDL cholesterol is elevated   Your triglycerides are normal.  The testing of your blood sugar, kidney function, liver function and electrolytes was normal.  TSH which is thyroid hormone is normal.  CBC result which includes white count Hemoglobin and  Platelet Counts is normal.         Sincerely,      Dr.Nasima Master MD,FACP

## 2023-12-01 ENCOUNTER — OFFICE VISIT (OUTPATIENT)
Dept: FAMILY MEDICINE | Facility: CLINIC | Age: 63
End: 2023-12-01
Payer: COMMERCIAL

## 2023-12-01 VITALS
DIASTOLIC BLOOD PRESSURE: 80 MMHG | HEART RATE: 67 BPM | SYSTOLIC BLOOD PRESSURE: 150 MMHG | WEIGHT: 164.6 LBS | BODY MASS INDEX: 32.32 KG/M2 | OXYGEN SATURATION: 100 % | RESPIRATION RATE: 16 BRPM | TEMPERATURE: 98.3 F

## 2023-12-01 DIAGNOSIS — Z12.11 SCREEN FOR COLON CANCER: ICD-10-CM

## 2023-12-01 DIAGNOSIS — E78.5 HYPERLIPIDEMIA, UNSPECIFIED HYPERLIPIDEMIA TYPE: ICD-10-CM

## 2023-12-01 DIAGNOSIS — R21 RASH AND NONSPECIFIC SKIN ERUPTION: ICD-10-CM

## 2023-12-01 DIAGNOSIS — I10 ESSENTIAL HYPERTENSION: Primary | ICD-10-CM

## 2023-12-01 PROCEDURE — 99214 OFFICE O/P EST MOD 30 MIN: CPT | Performed by: INTERNAL MEDICINE

## 2023-12-01 RX ORDER — SPIRONOLACTONE 25 MG/1
25 TABLET ORAL DAILY
Qty: 90 TABLET | Refills: 1 | Status: SHIPPED | OUTPATIENT
Start: 2023-12-01

## 2023-12-01 RX ORDER — TRIAMCINOLONE ACETONIDE 1 MG/G
CREAM TOPICAL 2 TIMES DAILY
Qty: 60 G | Refills: 0 | Status: SHIPPED | OUTPATIENT
Start: 2023-12-01 | End: 2024-08-09

## 2023-12-01 ASSESSMENT — PAIN SCALES - GENERAL: PAINLEVEL: NO PAIN (0)

## 2023-12-01 NOTE — PROGRESS NOTES
Violet was seen today for follow up.    Diagnoses and all orders for this visit:    Essential hypertension  -     spironolactone (ALDACTONE) 25 MG tablet; Take 1 tablet (25 mg) by mouth daily For Blood Pressure  Blood pressure elevated at 177/79, rechecked at 150/80. I prescribed spironolactone in addition to amlodipine and metoprolol. Patient monitors blood pressure at home. Patient counseled on risks of high blood pressure. Continue to monitor blood pressure at home and brings readings to next visit. Notify us if your blood pressure readings consistently stays greater than 140/90.    Hyperlipidemia, unspecified hyperlipidemia type  Patient is on Lipitor.    Rash and nonspecific skin eruption  Comments:  dorsal aspect of right fore-arm  appears like exzema  Orders:  -     Adult Dermatology  Referral; Future  -     triamcinolone (KENALOG) 0.1 % external cream; Apply topically 2 times daily For 14 days and then as needed  Rash on right forearm, patient reports itching. I prescribed a triamcinolone and sent a dermatology referral.  It looks clinically like eczema distal dorsal aspect  It is itchy rash  She has it for long time  No pain mostly itching  So most likely this is eczema    Screen for colon cancer  Patient will call to schedule next colonoscopy, order was sent to [facility].    Other  Patient declined Covid-19 booster today. She will receive RSV vaccine and shingle vaccine at a pharmacy.  Patient has a mammogram scheduled 12/07/2023.      Subjective   Violet is a 63 year old, presenting for the following health issues:  Follow Up (bp)      HPI   Violet is a 63 year old, presenting for the following health issues:  Follow Up (bp)    Review of Systems   Constitutional, HEENT, cardiovascular, pulmonary, GI, , musculoskeletal, neuro, skin, endocrine and psych systems are negative, except as otherwise noted.      Objective    BP (!) 150/80 (BP Location: Right arm, Patient Position: Sitting)    Pulse 67   Temp 98.3  F (36.8  C) (Oral)   Resp 16   Wt 74.7 kg (164 lb 9.6 oz)   SpO2 100%   BMI 32.32 kg/m    Body mass index is 32.32 kg/m .  Physical Exam   She is very nice and pleasant.  She is comfortable and not in any kind of distress.  She is fully alert awake oriented.   Rash on right forearm    This document serves as a record of the services and decisions personally performed and made by Dr. Thao. It was created on her behalf by Liliane Billingsley, a trained medical scribe. The creation of this document is based the provider's statements to the medical scribe.

## 2023-12-07 ENCOUNTER — HOSPITAL ENCOUNTER (OUTPATIENT)
Dept: MAMMOGRAPHY | Facility: CLINIC | Age: 63
Discharge: HOME | End: 2023-12-07
Attending: INTERNAL MEDICINE | Admitting: INTERNAL MEDICINE
Payer: COMMERCIAL

## 2023-12-07 DIAGNOSIS — Z12.31 VISIT FOR SCREENING MAMMOGRAM: ICD-10-CM

## 2023-12-07 PROCEDURE — 77067 SCR MAMMO BI INCL CAD: CPT

## 2024-03-05 NOTE — PATIENT INSTRUCTIONS
Respiratory syncytial virus (RSV) is an important cause of lower respiratory tract disease in older adults.   In 2023, the [place] Food and Drug Administration approved two recombinant RSV vaccines for the prevention of lower respiratory tract disease in individuals 60 years of age and older [1,2].     this vaccine prevented RSV-related respiratory infection and lower respiratory tract disease in adults age 60 years and older who received one dose of an AS01E-adjuvanted RSV Prefusion F Protein Vaccine [51].     This infection may cause serious infection like pneumonia in older patients     So it is good idea to get this RSV vaccine from any local pharmacy .    You declined for covid booster      Continue amlodipine and metoprolol  Start taking spironolactone for you Blood Pressure          Schedule colonoscopy at your earliest convenience by calling the following number:  [facility]  :[phone].      Monitor your blood pressure once a week  at home.  Bring those readings on your next visit.  Notify us if your blood pressure readings consistently stays greater than 140/90.   Bring your Blood Pressure readings next time     Follow up in 4-6 weeks  Seek sooner medical attention if there is any worsening of symptoms or problems.      
Klerman, Elyse(Attending)

## 2024-05-14 NOTE — RESULT ENCOUNTER NOTE
Viktor Lazo,    This is to inform you regarding your test result.    You are not immune to hepatitis B  You can get hepatitis B immunization if desire .    Sincerely,      Dr.Nasima Master MD,FACP       4 = No assist / stand by assistance

## 2024-10-17 ENCOUNTER — PATIENT OUTREACH (OUTPATIENT)
Dept: CARE COORDINATION | Facility: CLINIC | Age: 64
End: 2024-10-17
Payer: COMMERCIAL

## 2024-10-31 ENCOUNTER — PATIENT OUTREACH (OUTPATIENT)
Dept: CARE COORDINATION | Facility: CLINIC | Age: 64
End: 2024-10-31
Payer: COMMERCIAL

## 2024-11-08 DIAGNOSIS — I10 ESSENTIAL HYPERTENSION: ICD-10-CM

## 2024-11-11 RX ORDER — SPIRONOLACTONE 25 MG/1
25 TABLET ORAL DAILY
Qty: 90 TABLET | Refills: 0 | Status: SHIPPED | OUTPATIENT
Start: 2024-11-11 | End: 2024-12-02

## 2024-11-11 NOTE — TELEPHONE ENCOUNTER
She is due for the appointment and labs   Okay to use my reserve slot for the appointment    Dr.Nasima Master MD

## 2024-12-02 ENCOUNTER — VIRTUAL VISIT (OUTPATIENT)
Dept: FAMILY MEDICINE | Facility: CLINIC | Age: 64
End: 2024-12-02
Payer: COMMERCIAL

## 2024-12-02 DIAGNOSIS — I10 ESSENTIAL HYPERTENSION: Primary | ICD-10-CM

## 2024-12-02 DIAGNOSIS — E78.5 HYPERLIPIDEMIA, UNSPECIFIED HYPERLIPIDEMIA TYPE: ICD-10-CM

## 2024-12-02 DIAGNOSIS — Z78.0 ENCOUNTER FOR OSTEOPOROSIS SCREENING IN ASYMPTOMATIC POSTMENOPAUSAL PATIENT: ICD-10-CM

## 2024-12-02 DIAGNOSIS — Z13.0 SCREENING FOR DEFICIENCY ANEMIA: ICD-10-CM

## 2024-12-02 DIAGNOSIS — Z12.11 SCREEN FOR COLON CANCER: ICD-10-CM

## 2024-12-02 DIAGNOSIS — Z13.820 ENCOUNTER FOR OSTEOPOROSIS SCREENING IN ASYMPTOMATIC POSTMENOPAUSAL PATIENT: ICD-10-CM

## 2024-12-02 DIAGNOSIS — Z13.29 SCREENING FOR THYROID DISORDER: ICD-10-CM

## 2024-12-02 DIAGNOSIS — Z79.899 MEDICATION MANAGEMENT: ICD-10-CM

## 2024-12-02 DIAGNOSIS — E55.9 VITAMIN D DEFICIENCY: ICD-10-CM

## 2024-12-02 PROCEDURE — 99442 PR PHYSICIAN TELEPHONE EVALUATION 11-20 MIN: CPT | Mod: 93 | Performed by: INTERNAL MEDICINE

## 2024-12-02 RX ORDER — SPIRONOLACTONE 25 MG/1
25 TABLET ORAL DAILY
Qty: 90 TABLET | Refills: 0 | Status: SHIPPED | OUTPATIENT
Start: 2024-12-02 | End: 2024-12-02 | Stop reason: SINTOL

## 2024-12-02 RX ORDER — METOPROLOL SUCCINATE 100 MG/1
100 TABLET, EXTENDED RELEASE ORAL DAILY
Qty: 90 TABLET | Refills: 3 | Status: SHIPPED | OUTPATIENT
Start: 2024-12-02

## 2024-12-02 RX ORDER — AMLODIPINE BESYLATE 5 MG/1
5 TABLET ORAL DAILY
Qty: 90 TABLET | Refills: 1 | Status: SHIPPED | OUTPATIENT
Start: 2024-12-02

## 2024-12-02 ASSESSMENT — PATIENT HEALTH QUESTIONNAIRE - PHQ9: SUM OF ALL RESPONSES TO PHQ QUESTIONS 1-9: 0

## 2024-12-02 NOTE — PROGRESS NOTES
Violet is a 64 year old who is being evaluated via a billable telephone visit.    What phone number would you like to be contacted at? [phone]   How would you like to obtain your AVS? Neeraj  Originating Location (pt. Location): Home    Distant Location (provider location):  On-site    Violet was seen today for follow up.    Diagnoses and all orders for this visit:    Pt had her daughter available during the appointment    Essential hypertension  -     spironolactone (ALDACTONE) 25 MG tablet; Take 1 tablet (25 mg) by mouth daily. For Blood Pressure  -     amLODIPine (NORVASC) 5 MG tablet; Take 1 tablet (5 mg) by mouth daily.  -     metoprolol succinate ER (TOPROL XL) 100 MG 24 hr tablet; Take 1 tablet (100 mg) by mouth daily.  Pt advised to monitor BP 1-2 times a week. Pt does not take amlodipine or spirolactone at this time due to light headedness and dizziness side effect. I advised her to take amlodipine and metoprolol at different times in day to ensure BP is not too low and to be consistent with medication. I set a goal of BP readings below 140/90 for patient when monitoring at home.    So amlodipine every morning and metoprolol every evening   Send my chart message about your Blood Pressure readings    Screen for colon cancer  -     Colonoscopy Screening  Referral; Future  Pt previous colonoscopy was 09/09/2013 and was advised to complete. Pt declined at this time but order was placed so pt can complete at earliest convenience    Screening for osteoporosis  -     DEXA HIP/PELVIS/SPINE - Future; Future  Pt previous DEXA was 3/29/2011 and will schedule to complete at earliest convenience    Hyperlipidemia, unspecified hyperlipidemia type  -     Lipid panel reflex to direct LDL Non-fasting; Future  Patient will schedule lab appointment at earliest convenience    Screening for thyroid disorder  -     TSH with free T4 reflex; Future  See above    Medication management  -     Comprehensive  metabolic panel; Future    Screening for deficiency anemia  -     CBC with platelets; Future  Pt will complete at earliest convenience    Other orders  -     REVIEW OF HEALTH MAINTENANCE PROTOCOL ORDERS    Other   Pt will schedule wellness visit at her earliest convenience  Pt had last mammogram on 12/7/2023 and will schedule to complete at earliest convenience     Ashvin Lazo is a 64 year old, presenting for the following health issues:  Follow Up    HPI     Review of Systems  Constitutional, HEENT, cardiovascular, pulmonary, GI, , musculoskeletal, neuro, skin, endocrine and psych systems are negative, except as otherwise noted.      Objective       Vitals:  No vitals were obtained today due to virtual visit.    Physical Exam   General: Alert and no distress //Respiratory: No audible wheeze, cough, or shortness of breath // Psychiatric:  Appropriate affect, tone, and pace of words        Phone call duration: 11 minutes  Signed Electronically by: Loli Thao MD  Scribe Disclosure:   Christy ABARCA, am serving as a scribe; to document services personally performed by Loli Thao MD -based on data collection and the provider's statements to me.

## 2024-12-02 NOTE — PATIENT INSTRUCTIONS
You are due for mammogram  Please call the following number to make appointment :  [phone]  It is located in [address]      Monitor your blood pressure once a week  at home.  Bring those readings on your next visit.  Notify us if your blood pressure readings consistently stays greater than 140/90.    Start taking your Blood Pressure medication     Schedule colonoscopy at your earliest convenience by calling the following number:  [facility]  :[phone].    Schedule lab work     Follow up in 3 months     Seek sooner medical attention if there is any worsening of symptoms or problems.

## 2024-12-03 ENCOUNTER — PATIENT OUTREACH (OUTPATIENT)
Dept: CARE COORDINATION | Facility: CLINIC | Age: 64
End: 2024-12-03
Payer: COMMERCIAL

## 2025-01-05 ENCOUNTER — HEALTH MAINTENANCE LETTER (OUTPATIENT)
Age: 65
End: 2025-01-05

## 2025-05-08 ENCOUNTER — RESULTS FOLLOW-UP (OUTPATIENT)
Dept: FAMILY MEDICINE | Facility: CLINIC | Age: 65
End: 2025-05-08

## 2025-05-08 ENCOUNTER — LAB (OUTPATIENT)
Dept: LAB | Facility: CLINIC | Age: 65
End: 2025-05-08
Payer: MEDICARE

## 2025-05-08 DIAGNOSIS — G89.29 CHRONIC PAIN OF BOTH KNEES: ICD-10-CM

## 2025-05-08 DIAGNOSIS — Z79.899 MEDICATION MANAGEMENT: ICD-10-CM

## 2025-05-08 DIAGNOSIS — Z13.0 SCREENING FOR DEFICIENCY ANEMIA: ICD-10-CM

## 2025-05-08 DIAGNOSIS — Z12.31 VISIT FOR SCREENING MAMMOGRAM: Primary | ICD-10-CM

## 2025-05-08 DIAGNOSIS — E78.5 HYPERLIPIDEMIA, UNSPECIFIED HYPERLIPIDEMIA TYPE: ICD-10-CM

## 2025-05-08 DIAGNOSIS — M25.562 CHRONIC PAIN OF BOTH KNEES: ICD-10-CM

## 2025-05-08 DIAGNOSIS — Z13.29 SCREENING FOR THYROID DISORDER: ICD-10-CM

## 2025-05-08 DIAGNOSIS — M25.561 CHRONIC PAIN OF BOTH KNEES: ICD-10-CM

## 2025-05-08 LAB
ALBUMIN SERPL BCG-MCNC: 4.8 G/DL (ref 3.5–5.2)
ALP SERPL-CCNC: 77 U/L (ref 40–150)
ALT SERPL W P-5'-P-CCNC: 18 U/L (ref 0–50)
ANION GAP SERPL CALCULATED.3IONS-SCNC: 11 MMOL/L (ref 7–15)
AST SERPL W P-5'-P-CCNC: 24 U/L (ref 0–45)
BILIRUB SERPL-MCNC: 0.6 MG/DL
BUN SERPL-MCNC: 19.6 MG/DL (ref 8–23)
CALCIUM SERPL-MCNC: 10.3 MG/DL (ref 8.8–10.4)
CHLORIDE SERPL-SCNC: 97 MMOL/L (ref 98–107)
CHOLEST SERPL-MCNC: 183 MG/DL
CREAT SERPL-MCNC: 0.71 MG/DL (ref 0.51–0.95)
EGFRCR SERPLBLD CKD-EPI 2021: >90 ML/MIN/1.73M2
ERYTHROCYTE [DISTWIDTH] IN BLOOD BY AUTOMATED COUNT: 12.3 % (ref 10–15)
FASTING STATUS PATIENT QL REPORTED: YES
FASTING STATUS PATIENT QL REPORTED: YES
GLUCOSE SERPL-MCNC: 106 MG/DL (ref 70–99)
HCO3 SERPL-SCNC: 28 MMOL/L (ref 22–29)
HCT VFR BLD AUTO: 40.7 % (ref 35–47)
HDLC SERPL-MCNC: 69 MG/DL
HGB BLD-MCNC: 13.6 G/DL (ref 11.7–15.7)
LDLC SERPL CALC-MCNC: 92 MG/DL
MCH RBC QN AUTO: 28.7 PG (ref 26.5–33)
MCHC RBC AUTO-ENTMCNC: 33.4 G/DL (ref 31.5–36.5)
MCV RBC AUTO: 86 FL (ref 78–100)
NONHDLC SERPL-MCNC: 114 MG/DL
PLATELET # BLD AUTO: 210 10E3/UL (ref 150–450)
POTASSIUM SERPL-SCNC: 5.2 MMOL/L (ref 3.4–5.3)
PROT SERPL-MCNC: 7.9 G/DL (ref 6.4–8.3)
RBC # BLD AUTO: 4.74 10E6/UL (ref 3.8–5.2)
SODIUM SERPL-SCNC: 136 MMOL/L (ref 135–145)
TRIGL SERPL-MCNC: 108 MG/DL
TSH SERPL DL<=0.005 MIU/L-ACNC: 2.56 UIU/ML (ref 0.3–4.2)
WBC # BLD AUTO: 6.2 10E3/UL (ref 4–11)

## 2025-05-09 NOTE — RESULT ENCOUNTER NOTE
Viktor Lazo    This is to inform you regarding your test result.    The testing of your kidney function, liver function and electrolytes was satisfactory   Glucose which is your blood sugar is slightly elevated.  Avoid high sugar containing food.  Your total cholesterol is normal.  HDL which is called good cholesterol is normal.  Your LDL cholesterol is normal.  This is often call bad cholesterol and high levels increase the risk for heart attacks and strokes.  Your triglycerides are normal.  TSH which is thyroid hormone is normal.  CBC result which includes white count Hemoglobin and  Platelet Counts is normal.         Sincerely,      Dr.Nasima Masetr MD,FACP

## 2025-05-12 ENCOUNTER — PATIENT OUTREACH (OUTPATIENT)
Dept: CARE COORDINATION | Facility: CLINIC | Age: 65
End: 2025-05-12
Payer: MEDICARE

## 2025-05-14 ENCOUNTER — PATIENT OUTREACH (OUTPATIENT)
Dept: CARE COORDINATION | Facility: CLINIC | Age: 65
End: 2025-05-14
Payer: MEDICARE

## 2025-06-30 ENCOUNTER — E-VISIT (OUTPATIENT)
Dept: FAMILY MEDICINE | Facility: CLINIC | Age: 65
End: 2025-06-30
Payer: MEDICARE

## 2025-06-30 DIAGNOSIS — J06.9 UPPER RESPIRATORY TRACT INFECTION, UNSPECIFIED TYPE: Primary | ICD-10-CM

## 2025-06-30 PROCEDURE — 99207 PR NON-BILLABLE SERV PER CHARTING: CPT | Performed by: INTERNAL MEDICINE

## 2025-06-30 NOTE — PATIENT INSTRUCTIONS
Dear Violet Nunez,    We are sorry you are not feeling well. Based on the responses you provided, it is recommended that you be seen in-person in urgent care so we can better evaluate your symptoms. Please click here to find the nearest urgent care location to you.   You will not be charged for this Visit. Thank you for trusting us with your care.    Loli Thao MD

## 2025-07-31 ENCOUNTER — OFFICE VISIT (OUTPATIENT)
Dept: FAMILY MEDICINE | Facility: CLINIC | Age: 65
End: 2025-07-31
Payer: MEDICARE

## 2025-07-31 VITALS
RESPIRATION RATE: 20 BRPM | WEIGHT: 169 LBS | OXYGEN SATURATION: 100 % | SYSTOLIC BLOOD PRESSURE: 138 MMHG | DIASTOLIC BLOOD PRESSURE: 82 MMHG | HEIGHT: 60 IN | HEART RATE: 67 BPM | TEMPERATURE: 98.1 F | BODY MASS INDEX: 33.18 KG/M2

## 2025-07-31 DIAGNOSIS — Z00.00 ENCOUNTER FOR MEDICARE ANNUAL WELLNESS EXAM: Primary | ICD-10-CM

## 2025-07-31 DIAGNOSIS — Z78.0 ENCOUNTER FOR OSTEOPOROSIS SCREENING IN ASYMPTOMATIC POSTMENOPAUSAL PATIENT: ICD-10-CM

## 2025-07-31 DIAGNOSIS — Z12.4 CERVICAL CANCER SCREENING: ICD-10-CM

## 2025-07-31 DIAGNOSIS — N39.3 FEMALE STRESS INCONTINENCE: ICD-10-CM

## 2025-07-31 DIAGNOSIS — Z23 NEED FOR VACCINATION: ICD-10-CM

## 2025-07-31 DIAGNOSIS — Z12.11 COLON CANCER SCREENING: ICD-10-CM

## 2025-07-31 DIAGNOSIS — Z01.419 ENCOUNTER FOR GYNECOLOGICAL EXAMINATION (GENERAL) (ROUTINE) WITHOUT ABNORMAL FINDINGS: ICD-10-CM

## 2025-07-31 DIAGNOSIS — I10 ESSENTIAL HYPERTENSION: ICD-10-CM

## 2025-07-31 DIAGNOSIS — K21.9 GASTROESOPHAGEAL REFLUX DISEASE WITHOUT ESOPHAGITIS: ICD-10-CM

## 2025-07-31 DIAGNOSIS — Z13.820 ENCOUNTER FOR OSTEOPOROSIS SCREENING IN ASYMPTOMATIC POSTMENOPAUSAL PATIENT: ICD-10-CM

## 2025-07-31 DIAGNOSIS — E78.5 HYPERLIPIDEMIA, UNSPECIFIED HYPERLIPIDEMIA TYPE: ICD-10-CM

## 2025-07-31 RX ORDER — AMLODIPINE BESYLATE 5 MG/1
5 TABLET ORAL DAILY
Qty: 90 TABLET | Refills: 3 | Status: SHIPPED | OUTPATIENT
Start: 2025-07-31

## 2025-07-31 RX ORDER — ESTRADIOL 0.1 MG/G
2 CREAM VAGINAL
Qty: 42.5 G | Refills: 1 | Status: SHIPPED | OUTPATIENT
Start: 2025-07-31

## 2025-07-31 RX ORDER — FAMOTIDINE 20 MG/1
20 TABLET, FILM COATED ORAL 2 TIMES DAILY PRN
Qty: 60 TABLET | Refills: 2 | Status: SHIPPED | OUTPATIENT
Start: 2025-07-31

## 2025-07-31 RX ORDER — METOPROLOL SUCCINATE 100 MG/1
100 TABLET, EXTENDED RELEASE ORAL DAILY
Qty: 90 TABLET | Refills: 3 | Status: SHIPPED | OUTPATIENT
Start: 2025-07-31

## 2025-07-31 RX ORDER — ATORVASTATIN CALCIUM 20 MG/1
20 TABLET, FILM COATED ORAL DAILY
Qty: 90 TABLET | Refills: 3 | Status: SHIPPED | OUTPATIENT
Start: 2025-07-31

## 2025-07-31 SDOH — HEALTH STABILITY: PHYSICAL HEALTH: ON AVERAGE, HOW MANY MINUTES DO YOU ENGAGE IN EXERCISE AT THIS LEVEL?: 20 MIN

## 2025-07-31 SDOH — HEALTH STABILITY: PHYSICAL HEALTH: ON AVERAGE, HOW MANY DAYS PER WEEK DO YOU ENGAGE IN MODERATE TO STRENUOUS EXERCISE (LIKE A BRISK WALK)?: 2 DAYS

## 2025-07-31 ASSESSMENT — PATIENT HEALTH QUESTIONNAIRE - PHQ9
SUM OF ALL RESPONSES TO PHQ QUESTIONS 1-9: 1
10. IF YOU CHECKED OFF ANY PROBLEMS, HOW DIFFICULT HAVE THESE PROBLEMS MADE IT FOR YOU TO DO YOUR WORK, TAKE CARE OF THINGS AT HOME, OR GET ALONG WITH OTHER PEOPLE: NOT DIFFICULT AT ALL
SUM OF ALL RESPONSES TO PHQ QUESTIONS 1-9: 1

## 2025-07-31 ASSESSMENT — PAIN SCALES - GENERAL: PAINLEVEL_OUTOF10: MILD PAIN (2)

## 2025-07-31 ASSESSMENT — SOCIAL DETERMINANTS OF HEALTH (SDOH): HOW OFTEN DO YOU GET TOGETHER WITH FRIENDS OR RELATIVES?: MORE THAN THREE TIMES A WEEK

## 2025-07-31 NOTE — PATIENT INSTRUCTIONS
"Monitor your blood pressure once a week  at home.  Bring those readings on your next visit.  Notify us if your blood pressure readings consistently stays greater than 130/80.     There is a new shingles vaccine available called shingrex  It is a series of 2 shots 2-6 months apart.  Considered more than 90% effective.  Please go to any pharmacy to get the  vaccine    You are due for bone density.  Please call the following number to make appointment :  [phone]  It is located in [address]        Follow up in 6 months.  Seek sooner medical attention if there is any worsening of symptoms or problems.       Patient Education   Preventive Care Advice   This is general advice we often give to help people stay healthy. Your care team may have specific advice just for you. Please talk to your care team about your own preventive care needs.  Lifestyle  Exercise at least 150 minutes each week (30 minutes a day, 5 days a week).  Do muscle strengthening activities 2 days a week. These help control your weight and prevent disease.  No smoking.  Wear sunscreen to prevent skin cancer.  Take time with family and friends.  Have your home tested for radon every 2 to 5 years. Radon is a colorless, odorless gas that can harm your lungs. To learn more, go to www.health.[facility].mn. and search for \"Radon in Homes.\"  Keep guns unloaded and locked up in a safe place like a safe or gun vault, or, use a gun lock and hide the keys. Always lock away bullets separately. To learn more, visit [employer].[place].gov and search for \"safe gun storage.\"  Nutrition  Eat 5 or more servings of fruits and vegetables each day.  Try wheat bread, brown rice and whole grain pasta (instead of white bread, rice, and pasta).  Get enough calcium and vitamin D. Check the label on foods and aim for 100% of the RDA (recommended daily allowance).  Regular exams  Have a dental exam and cleaning every 6 months.  Older adults: Ask your care team how often to have memory " testing.  See your health care team every year to talk about:  Any changes in your health.  Any medicines your care team has prescribed.  Preventive care, family planning, and ways to prevent chronic diseases.  Shots (vaccines)   HPV shots (up to age 26), if you've never had them before.  Hepatitis B shots (up to age 59), if you've never had them before.  COVID-19 shot: Get this shot when it's due.  Flu shot: Get a flu shot every year.  Tetanus shot: Get a tetanus shot every 10 years.  Pneumococcal, hepatitis A, and RSV shots: Ask your care team if you need these based on your risk.  Shingles shot (for age 50 and up).  General health tests  Diabetes screening:  Starting at age 35, Get screened for diabetes at least every 3 years.  If you are younger than age 35, ask your care team if you should be screened for diabetes.  Cholesterol test: At age 39, start having a cholesterol test every 5 years, or more often if advised.  Bone density scan (DEXA): At age 50, ask your care team if you should have this scan for osteoporosis (brittle bones).  Hepatitis C: Get tested at least once in your life.  Abdominal aortic aneurysm screening: Talk to your doctor about having this screening if you:  Have ever smoked; and  Are biologically male; and  Are between the ages of 65 and 75.  STIs (sexually transmitted infections)  Before age 24: Ask your care team if you should be screened for STIs.  After age 24: Get screened for STIs if you're at risk. You are at risk for STIs (including HIV) if:  You are sexually active with more than one person.  You don't use condoms every time.  You or a partner was diagnosed with a sexually transmitted infection.  If you are at risk for HIV, ask about PrEP medicine to prevent HIV.  Get tested for HIV at least once in your life, whether you are at risk for HIV or not.  Cancer screening tests  Cervical cancer screening: If you have a cervix, begin getting regular cervical cancer screening tests at  age 21. Most people who have regular screenings with normal results can stop after age 65. Talk about this with your provider.  Breast cancer scan (mammogram): If you've ever had breasts, begin having regular mammograms starting at age 40. This is a scan to check for breast cancer.  Colon cancer screening: It is important to start screening for colon cancer at age 45.  Have a colonoscopy test every 10 years (or more often if you're at risk) Or, ask your provider about stool tests like a FIT test every year or Cologuard test every 3 years.  To learn more about your testing options, visit: www.[employer]/153359.pdf.  For help making a decision, visit: phyllis/tl99552.  Prostate cancer screening test: If you have a prostate and are age 55 to 69, ask your provider if you would benefit from a yearly prostate cancer screening test.  Lung cancer screening: If you are a current or former smoker age 50 to 80, ask your care team if ongoing lung cancer screenings are right for you.    For informational purposes only. Not to replace the advice of your health care provider. Copyright   2023 [employer]. All rights reserved. Clinically reviewed by the [facility] Transitions Program. [employer] [id number] - REV 6/25.  Preventing Falls: Care Instructions  Injuries and health problems such as trouble walking or poor eyesight can increase your risk of falling. So can some medicines. But there are things you can do to help prevent falls. You can exercise to get stronger. You can also arrange your home to make it safer.    Talk to your doctor about the medicines you take. Ask if any of them increase the risk of falls and whether they can be changed or stopped.   Try to exercise regularly. It can help improve your strength and balance. This can help lower your risk of falling.         Practice fall safety and prevention.   Wear low-heeled shoes that fit well and give your feet good support. Talk to your doctor if you  "have foot problems that make this hard.  Carry a cellphone or wear a medical alert device that you can use to call for help.  Use stepladders instead of chairs to reach high objects. Don't climb if you're at risk for falls. Ask for help, if needed.  Wear the correct eyeglasses, if you need them.        Make your home safer.   Remove rugs, cords, clutter, and furniture from walkways.  Keep your house well lit. Use night-lights in hallways and bathrooms.  Install and use sturdy handrails on stairways.  Wear nonskid footwear, even inside. Don't walk barefoot or in socks without shoes.        Be safe outside.   Use handrails, curb cuts, and ramps whenever possible.  Keep your hands free by using a shoulder bag or backpack.  Try to walk in well-lit areas. Watch out for uneven ground, changes in pavement, and debris.  Be careful in the winter. Walk on the grass or gravel when sidewalks are slippery. Use de-icer on steps and walkways. Add non-slip devices to shoes.    Put grab bars and nonskid mats in your shower or tub and near the toilet. Try to use a shower chair or bath bench when bathing.   Get into a tub or shower by putting in your weaker leg first. Get out with your strong side first. Have a phone or medical alert device in the bathroom with you.   Where can you learn more?  Go to https://www.[employer].net/patiented  Enter G117 in the search box to learn more about \"Preventing Falls: Care Instructions.\"  Current as of: July 31, 2024  Content Version: 14.5 2024-2025 [employer].   Care instructions adapted under license by your healthcare professional. If you have questions about a medical condition or this instruction, always ask your healthcare professional. [employer] disclaims any warranty or liability for your use of this information.       "

## 2025-07-31 NOTE — PROGRESS NOTES
Preventive Care Visit  [facility]  Loli Thao MD, Internal Medicine  Jul 31, 2025      Assessment & Plan     Violet was seen today for physical.    Diagnoses and all orders for this visit:    Encounter for Medicare annual wellness exam  Preventive health counseling was also done.  Counseled on healthy eating and physical activity  Last colonoscopy on 9/9/2013 and advised to repeat in 10 years.   Pt would prefer to do FIT test and orders were placed for this.   If pt changes her mind she can inform me to place order for colonoscopy.   Pt is due for DEXA and orders were placed.   Last mammo on 12/7/23 was negative and will repeat today.   Last pap on 9/3/21 was negative and pt will repeat today.   Pt is due for Covid and shingles vaccine.   Pt received PCV-20 today  Pt advanced to complete advanced directives and living will.   Pap smear completed today    Essential hypertension  -     amLODIPine (NORVASC) 5 MG tablet; Take 1 tablet (5 mg) by mouth daily.  -     metoprolol succinate ER (TOPROL XL) 100 MG 24 hr tablet; Take 1 tablet (100 mg) by mouth daily. For Blood Pressure  Bp today was 138/82  Pt states she has issues getting refill for amlodipine despite orders being placed.   Pt can contact our office if this is an issue.   Advised pt to monitor her BP at home.    Hyperlipidemia, unspecified hyperlipidemia type  -     atorvastatin (LIPITOR) 20 MG tablet; Take 1 tablet (20 mg) by mouth daily. For cholestrol    Gastroesophageal reflux disease without esophagitis  -     famotidine (PEPCID) 20 MG tablet; Take 1 tablet (20 mg) by mouth 2 times daily as needed (heartburn).  She states she uses famotidine prn.   She states cough has improved since taking.    Colon cancer screening  -     Fecal colorectal cancer screen (FIT); Future  Last colonoscopy on 9/9/2013 and advised to repeat in 10 years.   Pt would prefer to do FIT test and orders were placed for this.   If pt changes her mind she  can inform me to place order for colonoscopy.     Encounter for gynecological examination (general) (routine) without abnormal findings  -     HPV and Gynecologic Cytology Panel - Recommended Age 30 - 65 Years  Pap smear completed today    Cervical cancer screening  -     HPV and Gynecologic Cytology Panel - Recommended Age 30 - 65 Years  Pap smear completed today    Need for vaccination  Pt is due for Covid and shingles vaccine.   Pt received PCV-20 today    Encounter for osteoporosis screening in asymptomatic postmenopausal patient  -     DX Bone Density; Future  Pt is due for DEXA and orders were placed.   Pt should schedule to complete at earliest convenience.     Female stress incontinence  -     estradiol (ESTRACE) 0.1 MG/GM vaginal cream; Place 2 g vaginally twice a week.  Pt reports stress incontinence.   Advised her to use estrogen cream and do kegel exercises.  If these don't help I will refer her to urology.     Other orders  -     Pneumococcal 20 Valent Conjugate (PCV20)  -     REVIEW OF HEALTH MAINTENANCE PROTOCOL ORDERS    Other  Pt is currently retired and gets social security.     Patient has been advised of split billing requirements and indicates understanding: Yes    BMI  Estimated body mass index is 33.01 kg/m  as calculated from the following:    Height as of this encounter: 1.524 m (5').    Weight as of this encounter: 76.7 kg (169 lb).   Weight management plan: Discussed healthy diet and exercise guidelines    Counseling  Appropriate preventive services were addressed with this patient via screening, questionnaire, or discussion as appropriate for fall prevention, nutrition, physical activity, Tobacco-use cessation, social engagement, weight loss and cognition.  Checklist reviewing preventive services available has been given to the patient.  Reviewed patient's diet, addressing concerns and/or questions.   She is at risk for lack of exercise and has been provided with information to increase  physical activity for the benefit of her well-being.   The patient was instructed to see the dentist every 6 months.   Reviewed preventive health counseling, as reflected in patient instructions       Regular exercise       Healthy diet/nutrition       Osteoporosis prevention/bone health       Advance Care Planning    Pt will f/up on 2/5/26    Subjective   Violet is a 64 year old, presenting for the following:  Physical (Patient is here for annual wellness visit.  )        7/31/2025    11:13 AM   Additional Questions   Roomed by Alberto SCHWARTZ MA   Accompanied by Self      HPI  Violet is a 64 year old, presenting for the following:  Physical (Patient is here for annual wellness visit.  )     Advance Care Planning    Information is provided         7/31/2025   General Health   How would you rate your overall physical health? (!) FAIR   Feel stress (tense, anxious, or unable to sleep) Not at all         7/31/2025   Nutrition   Diet: Regular (no restrictions)         7/31/2025   Exercise   Days per week of moderate/strenous exercise 2 days   Average minutes spent exercising at this level 20 min   (!) EXERCISE CONCERN      7/31/2025   Social Factors   Frequency of gathering with friends or relatives More than three times a week   Worry food won't last until get money to buy more No   Food not last or not have enough money for food? No   Do you have housing? (Housing is defined as stable permanent housing and does not include staying outside in a car, in a tent, in an abandoned building, in an overnight shelter, or couch-surfing.) Yes   Are you worried about losing your housing? No   Lack of transportation? No   Unable to get utilities (heat,electricity)? No         7/31/2025   Fall Risk   Fallen 2 or more times in the past year? No   Trouble with walking or balance? No   Gait Speed Test (Document in seconds) 4.75   Gait Speed Test Interpretation Less than or equal to 5.00 seconds - PASS          7/31/2025   Activities of  Daily Living- Home Safety   Needs help with the following daily activites None of the above   Safety concerns in the home None of the above         7/31/2025   Dental   Dentist two times every year? (!) NO         7/31/2025   Hearing Screening   Hearing concerns? None of the above         7/31/2025   Driving Risk Screening   Patient/family members have concerns about driving No         7/31/2025   General Alertness/Fatigue Screening   Have you been more tired than usual lately? No         7/31/2025   Urinary Incontinence Screening   Bothered by leaking urine in past 6 months No       Today's PHQ-9 Score:       7/31/2025     8:43 AM   PHQ-9 SCORE   PHQ-9 Total Score MyChart 1 (Minimal depression)   PHQ-9 Total Score 1        Patient-reported         7/31/2025   Substance Use   Alcohol more than 3/day or more than 7/wk No   Do you have a current opioid prescription? No   How severe/bad is pain from 1 to 10? 1/10   Do you use any other substances recreationally? No     Social History     Tobacco Use    Smoking status: Never    Smokeless tobacco: Never   Vaping Use    Vaping status: Never Used   Substance Use Topics    Alcohol use: No     Alcohol/week: 0.0 standard drinks of alcohol    Drug use: No           12/7/2023   LAST FHS-7 RESULTS   1st degree relative breast or ovarian cancer No   Any relative bilateral breast cancer No   Any male have breast cancer No   Any ONE woman have BOTH breast AND ovarian cancer No   Any woman with breast cancer before 50yrs No   2 or more relatives with breast AND/OR ovarian cancer No   2 or more relatives with breast AND/OR bowel cancer No              History of abnormal Pap smear: No - age 65 or older with pap indicated due to inadequate prior screening (3 consecutive negative cytology results, 2 consecutive negative cotesting results, or 2 consecutive negative HrHPV test results within 10 years, with the most recent test occurring within the recommended screening interval for the  test used)        Latest Ref Rng & Units 9/3/2021    11:51 AM 12/22/2017     2:15 PM 12/22/2017     1:26 PM   PAP / HPV   PAP  Negative for Intraepithelial Lesion or Malignancy (NILM)      PAP (Historical)    NIL    HPV 16 DNA Negative Negative  Negative     HPV 18 DNA Negative Negative  Negative     Other HR HPV Negative Negative  Negative       ASCVD Risk   The 10-year ASCVD risk score (Jr MOORE, et al., 2019) is: 7.5%    Values used to calculate the score:      Age: 64 years      Sex: Female      Is Non- : No      Diabetic: No      Tobacco smoker: No      Systolic Blood Pressure: 138 mmHg      Is BP treated: Yes      HDL Cholesterol: 69 mg/dL      Total Cholesterol: 183 mg/dL        Reviewed and updated as needed this visit by Provider                  Current providers sharing in care for this patient include:  Patient Care Team:  Loli Thao MD as PCP - General (Internal Medicine)  Loli Thao MD as Assigned PCP  Melanie Caraballo PA-C as Physician Assistant (Dermatology)    The following health maintenance items are reviewed in Epic and correct as of today:  Health Maintenance   Topic Date Due    DEXA  Never done    DEPRESSION ACTION PLAN  Never done    ZOSTER VACCINE (1 of 2) Never done    COLORECTAL CANCER SCREENING  09/09/2023    COVID-19 VACCINE (4 - 2024-25 season) 09/01/2024    PAP FOLLOW-UP  09/03/2024    HPV FOLLOW-UP  09/03/2024    INFLUENZA VACCINE (1) 09/01/2025    MAMMO SCREENING  12/07/2025    PHQ-9  01/31/2026    BMP  05/08/2026    LIPID  05/08/2026    MEDICARE ANNUAL WELLNESS VISIT  07/31/2026    ANNUAL REVIEW OF HM ORDERS  07/31/2026    FALL RISK ASSESSMENT  07/31/2026    ADVANCE CARE PLANNING  09/03/2026    DIABETES SCREENING  05/08/2028    DTAP/TDAP/TD VACCINE (3 - Td or Tdap) 10/22/2031    RSV VACCINE (1 - 1-dose 75+ series) 11/16/2034    HEPATITIS C SCREENING  Completed    HIV SCREENING  Completed    PNEUMOCOCCAL VACCINE 50+ YEARS  Completed     HPV VACCINE (No Doses Required) Completed    MENINGITIS VACCINE  Aged Out    PAP  Discontinued     Review of Systems  Constitutional, HEENT, cardiovascular, pulmonary, GI, , musculoskeletal, neuro, skin, endocrine and psych systems are negative, except as otherwise noted.     Objective    Exam  /82 (BP Location: Right arm, Patient Position: Sitting, Cuff Size: Adult Large)   Pulse 67   Temp 98.1  F (36.7  C) (Temporal)   Resp 20   Ht 1.524 m (5')   Wt 76.7 kg (169 lb)   SpO2 100%   BMI 33.01 kg/m     Estimated body mass index is 33.01 kg/m  as calculated from the following:    Height as of this encounter: 1.524 m (5').    Weight as of this encounter: 76.7 kg (169 lb).    Physical Exam  GENERAL: alert and no distress  EYES: Eyes grossly normal to inspection, PERRL and conjunctivae and sclerae normal  HENT: ear canals and TM's normal, nose and mouth without ulcers or lesions  NECK: no adenopathy, no asymmetry, masses, or scars  RESP: lungs clear to auscultation - no rales, rhonchi or wheezes  BREAST: normal without masses, tenderness or nipple discharge and no palpable axillary masses or adenopathy  CV: regular rate and rhythm, normal S1 S2, no S3 or S4, no murmur, click or rub, no peripheral edema  ABDOMEN: soft, nontender, no hepatosplenomegaly, no masses and bowel sounds normal Scar from    (female): normal female external genitalia, normal urethral meatus, normal vaginal mucosa  MS: no gross musculoskeletal defects noted, no edema  SKIN: no suspicious lesions or rashes  NEURO: Normal strength and tone, mentation intact and speech normal  PSYCH: mentation appears normal, affect normal/bright  Pap smear completed today    Gait and balance assessed per Gait Speed Test.  Result as above.        2025   Mini Cog   Clock Draw Score 2 Normal   3 Item Recall 2 objects recalled   Mini Cog Total Score 4         Vision Screen  LEFT EYE: 10/12.5 (20/25)  RIGHT EYE: (!) 10/20 (20/40)  Vision  Screen Results: (!) REFER      Signed Electronically by: Loli Thao MD  Scribe Disclosure:   I, Christy Good, am serving as a scribe; to document services personally performed by Loli Thao MD -based on data collection and the provider's statements to me.    Answers submitted by the patient for this visit:  Patient Health Questionnaire (Submitted on 7/31/2025)  If you checked off any problems, how difficult have these problems made it for you to do your work, take care of things at home, or get along with other people?: Not difficult at all  PHQ9 TOTAL SCORE: 1

## 2025-07-31 NOTE — NURSING NOTE
Prior to immunization administration, verified patients identity using patient s name and date of birth. Please see Immunization Activity for additional information.     Screening Questionnaire for Adult Immunization    Are you sick today?   No   Do you have allergies to medications, food, a vaccine component or latex?   Yes   Have you ever had a serious reaction after receiving a vaccination?   No   Do you have a long-term health problem with heart, lung, kidney, or metabolic disease (e.g., diabetes), asthma, a blood disorder, no spleen, complement component deficiency, a cochlear implant, or a spinal fluid leak?  Are you on long-term aspirin therapy?   No   Do you have cancer, leukemia, HIV/AIDS, or any other immune system problem?   No   Do you have a parent, brother, or sister with an immune system problem?   No   In the past 3 months, have you taken medications that affect  your immune system, such as prednisone, other steroids, or anticancer drugs; drugs for the treatment of rheumatoid arthritis, Crohn s disease, or psoriasis; or have you had radiation treatments?   No   Have you had a seizure, or a brain or other nervous system problem?   No   During the past year, have you received a transfusion of blood or blood    products, or been given immune (gamma) globulin or antiviral drug?   No   For women: Are you pregnant or is there a chance you could become       pregnant during the next month?   No   Have you received any vaccinations in the past 4 weeks?   No     Immunization questionnaire was positive for at least one answer.  Notified Dr. Thao.    Patient is receiving PCV 20.        Patient instructed to remain in clinic for 15 minutes afterwards, and to report any adverse reactions.     Screening performed by Alberto Fishman MA on 7/31/2025 at 12:18 PM.

## 2025-08-05 LAB
BKR AP ASSOCIATED HPV REPORT: NORMAL
BKR LAB AP GYN ADEQUACY: NORMAL
BKR LAB AP GYN INTERPRETATION: NORMAL
BKR LAB AP PREVIOUS ABNORMAL: NORMAL
PATH REPORT.COMMENTS IMP SPEC: NORMAL
PATH REPORT.COMMENTS IMP SPEC: NORMAL
PATH REPORT.RELEVANT HX SPEC: NORMAL

## 2025-08-14 ENCOUNTER — ORDERS ONLY (AUTO-RELEASED) (OUTPATIENT)
Dept: FAMILY MEDICINE | Facility: CLINIC | Age: 65
End: 2025-08-14
Payer: MEDICARE